# Patient Record
Sex: FEMALE | Race: WHITE | NOT HISPANIC OR LATINO | Employment: FULL TIME | URBAN - METROPOLITAN AREA
[De-identification: names, ages, dates, MRNs, and addresses within clinical notes are randomized per-mention and may not be internally consistent; named-entity substitution may affect disease eponyms.]

---

## 2018-06-29 ENCOUNTER — TRANSFERRED RECORDS (OUTPATIENT)
Dept: HEALTH INFORMATION MANAGEMENT | Facility: CLINIC | Age: 38
End: 2018-06-29

## 2019-01-03 ENCOUNTER — OFFICE VISIT (OUTPATIENT)
Dept: PEDIATRICS | Facility: CLINIC | Age: 39
End: 2019-01-03
Payer: COMMERCIAL

## 2019-01-03 VITALS
WEIGHT: 160.8 LBS | DIASTOLIC BLOOD PRESSURE: 60 MMHG | BODY MASS INDEX: 28.49 KG/M2 | HEIGHT: 63 IN | HEART RATE: 78 BPM | TEMPERATURE: 97.4 F | OXYGEN SATURATION: 98 % | SYSTOLIC BLOOD PRESSURE: 130 MMHG

## 2019-01-03 DIAGNOSIS — K21.9 GASTROESOPHAGEAL REFLUX DISEASE WITHOUT ESOPHAGITIS: Primary | ICD-10-CM

## 2019-01-03 PROCEDURE — 99203 OFFICE O/P NEW LOW 30 MIN: CPT | Performed by: NURSE PRACTITIONER

## 2019-01-03 RX ORDER — OMEPRAZOLE 40 MG/1
40 CAPSULE, DELAYED RELEASE ORAL DAILY
Qty: 90 CAPSULE | Refills: 1 | Status: SHIPPED | OUTPATIENT
Start: 2019-01-03 | End: 2019-07-22

## 2019-01-03 RX ORDER — OMEPRAZOLE 40 MG/1
40 CAPSULE, DELAYED RELEASE ORAL DAILY
COMMUNITY
Start: 2018-08-29 | End: 2019-01-03

## 2019-01-03 ASSESSMENT — MIFFLIN-ST. JEOR: SCORE: 1368.51

## 2019-01-03 NOTE — PATIENT INSTRUCTIONS
PLAN:   1.   Symptomatic therapy suggested: restart prilosec.  2.  Orders Placed This Encounter   Medications     DISCONTD: omeprazole (PRILOSEC) 40 MG DR capsule     Sig: Take 40 mg by mouth daily     omeprazole (PRILOSEC) 40 MG DR capsule     Sig: Take 1 capsule (40 mg) by mouth daily     Dispense:  90 capsule     Refill:  1     Orders Placed This Encounter   Procedures     GASTROENTEROLOGY ADULT REF PROCEDURE ONLY [facility] [phone]       3. Patient needs to follow up in if no improvement,or sooner if worsening of symptoms or other symptoms develop.  CONSULTATION/REFERRAL to endoscopy

## 2019-01-03 NOTE — PROGRESS NOTES
SUBJECTIVE:   Zulma Coronado is a 38 year old female who presents to clinic today for the following health issues:    New Patient/Transfer of Care    Medication Followup of omeprazole     Taking Medication as prescribed: yes    Side Effects:  None    Medication Helping Symptoms:  Yes-out of meds for 3 weeks   Has been on prilosec for at least 8 years on and off   Pretty consistently at least 5 years  Has had H pylori tested and was negative  Has tried OTC medications and has not helped in the past   Is taking Prilosec 20 mg daily and is having breakthrough  Has bad reflux that will shoot up in the middle   Not a coffee drinker, rarely alcohol   Does not smoke     Problem list and histories reviewed & adjusted, as indicated.  Additional history: as documented    Patient Active Problem List   Diagnosis     Gastroesophageal reflux disease without esophagitis     Past Surgical History:   Procedure Laterality Date     NO HISTORY OF SURGERY         Social History     Tobacco Use     Smoking status: Never Smoker     Smokeless tobacco: Never Used   Substance Use Topics     Alcohol use: Yes     Comment: rarely     Family History   Problem Relation Age of Onset     Gastrointestinal Disease Mother         ulcerative colitis     Arthritis Mother         fibromyalgia     Crohn's Disease Mother      Ulcerative Colitis Mother      Cerebrovascular Disease Maternal Grandfather          Current Outpatient Medications   Medication Sig Dispense Refill     omeprazole (PRILOSEC) 40 MG DR capsule Take 40 mg by mouth daily       Allergies   Allergen Reactions     No Known Drug Allergies      BP Readings from Last 3 Encounters:   01/03/19 130/60   03/29/02 114/70    Wt Readings from Last 3 Encounters:   01/03/19 72.9 kg (160 lb 12.8 oz)   03/29/02 64 kg (141 lb)                  Labs reviewed in EPIC    Reviewed and updated as needed this visit by clinical staff  Tobacco  Allergies  Meds  Med Hx  Surg Hx  Fam Hx  Soc Hx     "  Reviewed and updated as needed this visit by Provider         ROS:  Constitutional, HEENT, cardiovascular, pulmonary, GI, , musculoskeletal, neuro, skin, endocrine and psych systems are negative, except as otherwise noted.    OBJECTIVE:     /60 (BP Location: Right arm, Patient Position: Sitting, Cuff Size: Adult Regular)   Pulse 78   Temp 97.4  F (36.3  C) (Temporal)   Ht 1.6 m (5' 3\")   Wt 72.9 kg (160 lb 12.8 oz)   SpO2 98%   Breastfeeding? No   BMI 28.48 kg/m    Body mass index is 28.48 kg/m .  GENERAL APPEARANCE: alert, active and no distress  NECK: no adenopathy, no asymmetry, masses, or scars and thyroid normal to palpation  RESP: lungs clear to auscultation - no rales, rhonchi or wheezes  CV: regular rates and rhythm and no murmur, click or rub  ABDOMEN: soft, nontender, without hepatosplenomegaly or masses and bowel sounds normal  MS: extremities normal- no gross deformities noted  NEURO: Normal strength and tone, mentation intact and speech normal  PSYCH: mentation appears normal and affect normal/bright    Diagnostic Test Results:  Pending orders and results       ASSESSMENT/PLAN:     Zulma was seen today for establish care and heartburn.    Diagnoses and all orders for this visit:    Gastroesophageal reflux disease without esophagitis  Restart:      omeprazole (PRILOSEC) 40 MG DR capsule; Take 1 capsule (40 mg) by mouth daily  -     GASTROENTEROLOGY ADULT REF PROCEDURE ONLY [facility] [phone]  Discussed the etiology of GERD with patient.  Encouraged lifestyle changes and the need for a longterm regimen of medications to help reduce symptoms.  Discussed the potential for serious complications if symptoms are not resolved.    Discussed raising the head of the bed 4 to 6 inches; avoiding chocolate, coffee, peppermint, fruit juices, tomatoes, greasy and spicy foods.  Encouraged moderation of alcoholic beverages, and avoidance of smoking.    PLAN:   1.   Symptomatic therapy " suggested: restart prilosec.  2.  Orders Placed This Encounter   Medications     DISCONTD: omeprazole (PRILOSEC) 40 MG DR capsule     Sig: Take 40 mg by mouth daily     omeprazole (PRILOSEC) 40 MG DR capsule     Sig: Take 1 capsule (40 mg) by mouth daily     Dispense:  90 capsule     Refill:  1     Orders Placed This Encounter   Procedures     GASTROENTEROLOGY ADULT REF PROCEDURE ONLY [facility] [phone]       3. Patient needs to follow up in if no improvement,or sooner if worsening of symptoms or other symptoms develop.  CONSULTATION/REFERRAL to endoscopy     See Patient Instructions  I have reviewed the patient's Past Medical History, Past Surgical History, Social History, Problem List and Medication List.  I have updated the patient's Past Medical History, Past Surgical History, Short Social History, Family History and Medication List.    ROMA Gallagher CNP  [facility]

## 2019-01-03 NOTE — NURSING NOTE
"Chief Complaint   Patient presents with     Establish Care     Heartburn     acid reflux       Initial /60 (BP Location: Right arm, Patient Position: Sitting, Cuff Size: Adult Regular)   Pulse 78   Temp 97.4  F (36.3  C) (Temporal)   Ht 1.6 m (5' 3\")   Wt 72.9 kg (160 lb 12.8 oz)   SpO2 98%   Breastfeeding? No   BMI 28.48 kg/m   Estimated body mass index is 28.48 kg/m  as calculated from the following:    Height as of this encounter: 1.6 m (5' 3\").    Weight as of this encounter: 72.9 kg (160 lb 12.8 oz).  Medication Reconciliation: complete      IRMA Ojeda      "

## 2019-01-19 PROBLEM — K21.9 GASTROESOPHAGEAL REFLUX DISEASE WITHOUT ESOPHAGITIS: Status: ACTIVE | Noted: 2019-01-19

## 2019-01-25 ASSESSMENT — MIFFLIN-ST. JEOR: SCORE: 1368.13

## 2019-02-01 ENCOUNTER — SURGERY (OUTPATIENT)
Age: 39
End: 2019-02-01
Payer: COMMERCIAL

## 2019-02-01 ENCOUNTER — HOSPITAL ENCOUNTER (OUTPATIENT)
Facility: AMBULATORY SURGERY CENTER | Age: 39
Discharge: HOME | End: 2019-02-01
Attending: INTERNAL MEDICINE | Admitting: INTERNAL MEDICINE
Payer: COMMERCIAL

## 2019-02-01 VITALS
DIASTOLIC BLOOD PRESSURE: 70 MMHG | OXYGEN SATURATION: 96 % | RESPIRATION RATE: 20 BRPM | HEART RATE: 107 BPM | SYSTOLIC BLOOD PRESSURE: 115 MMHG | TEMPERATURE: 98.2 F | WEIGHT: 160.72 LBS | BODY MASS INDEX: 28.48 KG/M2 | HEIGHT: 63 IN

## 2019-02-01 LAB — UPPER GI ENDOSCOPY: NORMAL

## 2019-02-01 PROCEDURE — 43239 EGD BIOPSY SINGLE/MULTIPLE: CPT | Performed by: INTERNAL MEDICINE

## 2019-02-01 PROCEDURE — 43239 EGD BIOPSY SINGLE/MULTIPLE: CPT

## 2019-02-01 PROCEDURE — G8907 PT DOC NO EVENTS ON DISCHARG: HCPCS

## 2019-02-01 PROCEDURE — 88305 TISSUE EXAM BY PATHOLOGIST: CPT | Performed by: INTERNAL MEDICINE

## 2019-02-01 PROCEDURE — G8918 PT W/O PREOP ORDER IV AB PRO: HCPCS

## 2019-02-01 RX ORDER — FENTANYL CITRATE 50 UG/ML
INJECTION, SOLUTION INTRAMUSCULAR; INTRAVENOUS PRN
Status: DISCONTINUED | OUTPATIENT
Start: 2019-02-01 | End: 2019-02-01 | Stop reason: HOSPADM

## 2019-02-01 RX ORDER — LIDOCAINE 40 MG/G
CREAM TOPICAL
Status: DISCONTINUED | OUTPATIENT
Start: 2019-02-01 | End: 2019-02-02 | Stop reason: HOSPADM

## 2019-02-01 RX ORDER — ONDANSETRON 2 MG/ML
4 INJECTION INTRAMUSCULAR; INTRAVENOUS
Status: DISCONTINUED | OUTPATIENT
Start: 2019-02-01 | End: 2019-02-02 | Stop reason: HOSPADM

## 2019-02-01 RX ADMIN — FENTANYL CITRATE 50 MCG: 50 INJECTION, SOLUTION INTRAMUSCULAR; INTRAVENOUS at 12:02

## 2019-02-01 RX ADMIN — FENTANYL CITRATE 50 MCG: 50 INJECTION, SOLUTION INTRAMUSCULAR; INTRAVENOUS at 12:04

## 2019-02-01 RX ADMIN — FENTANYL CITRATE 25 MCG: 50 INJECTION, SOLUTION INTRAMUSCULAR; INTRAVENOUS at 12:08

## 2019-02-05 LAB — COPATH REPORT: NORMAL

## 2019-02-12 ENCOUNTER — TELEPHONE (OUTPATIENT)
Dept: PEDIATRICS | Facility: CLINIC | Age: 39
End: 2019-02-12

## 2019-02-12 NOTE — TELEPHONE ENCOUNTER
[facility] Call Center    Phone Message    May a detailed message be left on voicemail: no    Reason for Call: Requesting Results   Name/type of test: Endoscopy biopsy  Date of test: 02.01.19  Was test done at a location other than [facility] (Please fill in the location if not [facility])?: No. At     Pt is asking for a call back regarding test results.  [facility] recommended message.   Action Taken: Message routed to:  Primary Care p [phone]

## 2019-02-12 NOTE — TELEPHONE ENCOUNTER
Called and spoke to patient who is aware of the 2/6/19 pathology results and the 2/6/19 letter from Dr. Nunez. Patient verbalized understanding and is aware that she will be receiving a letter with the results in the mail. Patient had no further questions at this time.    Antonia Jensen RN, BSN

## 2019-03-09 ENCOUNTER — HEALTH MAINTENANCE LETTER (OUTPATIENT)
Age: 39
End: 2019-03-09

## 2019-07-22 DIAGNOSIS — K21.9 GASTROESOPHAGEAL REFLUX DISEASE WITHOUT ESOPHAGITIS: ICD-10-CM

## 2019-07-22 RX ORDER — OMEPRAZOLE 40 MG/1
CAPSULE, DELAYED RELEASE ORAL
Qty: 90 CAPSULE | Refills: 1 | Status: SHIPPED | OUTPATIENT
Start: 2019-07-22 | End: 2020-02-04

## 2019-07-22 NOTE — TELEPHONE ENCOUNTER
Please refer to RN refill guidelines. Refilled per protocol.    Gladis Dickson RN   [facility], [facility]

## 2019-08-23 ENCOUNTER — OFFICE VISIT (OUTPATIENT)
Dept: OBGYN | Facility: CLINIC | Age: 39
End: 2019-08-23
Payer: COMMERCIAL

## 2019-08-23 ENCOUNTER — RESULT FOLLOW UP (OUTPATIENT)
Dept: OBGYN | Facility: OTHER | Age: 39
End: 2019-08-23

## 2019-08-23 VITALS
SYSTOLIC BLOOD PRESSURE: 117 MMHG | HEIGHT: 63 IN | DIASTOLIC BLOOD PRESSURE: 70 MMHG | WEIGHT: 138.31 LBS | BODY MASS INDEX: 24.51 KG/M2 | HEART RATE: 70 BPM

## 2019-08-23 DIAGNOSIS — R87.610 ASCUS WITH POSITIVE HIGH RISK HPV CERVICAL: ICD-10-CM

## 2019-08-23 DIAGNOSIS — Z97.5 IUD (INTRAUTERINE DEVICE) IN PLACE: ICD-10-CM

## 2019-08-23 DIAGNOSIS — Z13.6 CARDIOVASCULAR SCREENING; LDL GOAL LESS THAN 160: ICD-10-CM

## 2019-08-23 DIAGNOSIS — R87.810 ASCUS WITH POSITIVE HIGH RISK HPV CERVICAL: ICD-10-CM

## 2019-08-23 DIAGNOSIS — Z01.419 WELL FEMALE EXAM WITH ROUTINE GYNECOLOGICAL EXAM: Primary | ICD-10-CM

## 2019-08-23 PROCEDURE — 87624 HPV HI-RISK TYP POOLED RSLT: CPT | Performed by: OBSTETRICS & GYNECOLOGY

## 2019-08-23 PROCEDURE — 99386 PREV VISIT NEW AGE 40-64: CPT | Performed by: OBSTETRICS & GYNECOLOGY

## 2019-08-23 PROCEDURE — 88142 CYTOPATH C/V THIN LAYER: CPT | Performed by: OBSTETRICS & GYNECOLOGY

## 2019-08-23 ASSESSMENT — MIFFLIN-ST. JEOR: SCORE: 1261.51

## 2019-08-23 NOTE — PROGRESS NOTES
SUBJECTIVE:   CC: Zulma Coronado is an 39 year old woman who presents for preventive health visit.     Healthy Habits:    Do you get at least three servings of calcium containing foods daily (dairy, green leafy vegetables, etc.)? yes    Amount of exercise or daily activities, outside of work: 5 day(s) per week    Problems taking medications regularly No    Medication side effects: No    Have you had an eye exam in the past two years? no    Do you see a dentist twice per year? yes    Do you have sleep apnea, excessive snoring or daytime drowsiness?no      No concerns    Lipids last done at [facility] in , normal.      No history of GDM or GHTN.  Mirena for contraception.     ASCUS with positive high risk HPV cervical 2018   Overview:     2018 ASCUS, HPV +  2018 [place]: Negative    Plan: Cotesting 2019           Today's PHQ-2 Score:   PHQ-2 (  [employer]) 1/3/2019   Q1: Little interest or pleasure in doing things 0   Q2: Feeling down, depressed or hopeless 0   PHQ-2 Score 0       Abuse: Current or Past(Physical, Sexual or Emotional)- No  Do you feel safe in your environment? Yes    Social History     Tobacco Use     Smoking status: Never Smoker     Smokeless tobacco: Never Used   Substance Use Topics     Alcohol use: Yes     Comment: rarely     If you drink alcohol do you typically have >3 drinks per day or >7 drinks per week? No                     BP Readings from Last 3 Encounters:   19 117/70   19 115/70   19 130/60    Wt Readings from Last 3 Encounters:   19 62.7 kg (138 lb 5 oz)   19 72.9 kg (160 lb 11.5 oz)   19 72.9 kg (160 lb 12.8 oz)          Obstetrics History      Grav Para Term Pre Abrt (TAB) (SAB) (Ect) Mult Lvng Comments   2 2 1 1           2      Date Outcome GA Total Labor Labor/2nd/3rd Weight Sex Delivery Anes PTL Prema A1 A5 Name Clin   2004 Term 37w0d     6 lb 14 oz F      Living     radha     2011  34w2d     6 lb 2.4 oz M       Living 7 8 [facility]               Patient Active Problem List   Diagnosis     Gastroesophageal reflux disease without esophagitis     Past Surgical History:   Procedure Laterality Date      SECTION       COMBINED ESOPHAGOSCOPY, GASTROSCOPY, DUODENOSCOPY (EGD) WITH CO2 INSUFFLATION N/A 2019    Procedure: COMBINED ESOPHAGOSCOPY, GASTROSCOPY, DUODENOSCOPY (EGD) WITH CO2 INSUFFLATION;  Surgeon: Chacorta Nunez MD;  Location: [facility] OR     ESOPHAGOSCOPY, GASTROSCOPY, DUODENOSCOPY (EGD), COMBINED N/A 2019    Procedure: Combined Esophagoscopy, Gastroscopy, Duodenoscopy (Egd), Biopsy Single Or Multiple;  Surgeon: Chacorta Nunez MD;  Location: [facility] OR     NO HISTORY OF SURGERY         Social History     Tobacco Use     Smoking status: Never Smoker     Smokeless tobacco: Never Used   Substance Use Topics     Alcohol use: Yes     Comment: rarely     Family History   Problem Relation Age of Onset     Gastrointestinal Disease Mother         ulcerative colitis     Arthritis Mother         fibromyalgia     Crohn's Disease Mother      Ulcerative Colitis Mother      Cerebrovascular Disease Maternal Grandfather          Current Outpatient Medications   Medication Sig Dispense Refill     levonorgestrel (MIRENA) 20 MCG/24HR IUD 1 each by Intrauterine route once       omeprazole (PRILOSEC) 40 MG DR capsule TAKE 1 CAPSULE BY MOUTH EVERY DAY 90 capsule 1     Allergies   Allergen Reactions     No Known Drug Allergies      No lab results found.     Mammogram Screening: Patient under age 50, mutual decision reflected in health maintenance.      History of abnormal Pap smear: YES - updated in Problem List and Health Maintenance accordingly     ROS:  CONSTITUTIONAL: NEGATIVE for fever, chills, change in weight  INTEGUMENTARU/SKIN: NEGATIVE for worrisome rashes, moles or lesions  EYES: NEGATIVE for vision changes or irritation  ENT: NEGATIVE for ear, mouth and throat problems  RESP: NEGATIVE for  "significant cough or SOB  BREAST: NEGATIVE for masses, tenderness or discharge  CV: NEGATIVE for chest pain, palpitations or peripheral edema  GI: NEGATIVE for nausea, abdominal pain, heartburn, or change in bowel habits  : NEGATIVE for unusual urinary or vaginal symptoms. No regular periods with the IUD in place.  She will go 6 months without spotting, then have some irregular spotting for two week.   MUSCULOSKELETAL: NEGATIVE for significant arthralgias or myalgia  NEURO: NEGATIVE for weakness, dizziness or paresthesias  PSYCHIATRIC: NEGATIVE for changes in mood or affect    OBJECTIVE:   /70 (BP Location: Right arm, Patient Position: Sitting, Cuff Size: Adult Regular)   Pulse 70   Ht 1.6 m (5' 3\")   Wt 62.7 kg (138 lb 5 oz)   BMI 24.50 kg/m    EXAM:  Gen: Alert and oriented times 3, no acute distress.  Well developed, well nourished, pleasant.    Neck: Supple, no masses.  No thyromegaly.  Breast: Symmetrical without lesions.  No dimpling, nipple discharge, or discrete masses.  No lymphadenopathy.  Chest:  Non labored.  Clear to auscultation bilaterally.    Heart: Regular, normal S1, S2.  No murmurs.   Abdomen: Soft, nontender, nondistended.  No hepatosplenomegaly.    :  Normal female external genitalia.  No lesions.  Urethral meatus normal.  Speculum exam reveals a normal vaginal vault, normal cervix .  No abnormal discharge.  Bimanual exam reveals a normal, mobile, nontender uterus .  No cervical motion tenderness.  Adnexa nontender with no palpable masses.    Extremities:  Nontender, no edema.    Pap obtained:  Yes     ASSESSMENT/PLAN:       ICD-10-CM    1. Well female exam with routine gynecological exam Z01.419 **Glucose FUTURE anytime   2. ASCUS with positive high risk HPV cervical R87.610 Pap imaged thin layer diagnostic with HPV (select HPV order below)    R87.810 HPV High Risk Types DNA Cervical   3. IUD (intrauterine device) in place Z97.5    4. CARDIOVASCULAR SCREENING; LDL GOAL LESS THAN " "160 Z13.6 Lipid panel reflex to direct LDL Fasting       IUD replaced 3/1/16 at [facility], due for replacement March 2021    GERD - continue with GI.      COUNSELING:   Reviewed preventive health counseling, as reflected in patient instructions    Estimated body mass index is 24.5 kg/m  as calculated from the following:    Height as of this encounter: 1.6 m (5' 3\").    Weight as of this encounter: 62.7 kg (138 lb 5 oz).         reports that she has never smoked. She has never used smokeless tobacco.      Counseling Resources:  ATP IV Guidelines  Pooled Cohorts Equation Calculator  Breast Cancer Risk Calculator  FRAX Risk Assessment  ICSI Preventive Guidelines  Dietary Guidelines for Americans, 2010  USDA's MyPlate  ASA Prophylaxis  Lung CA Screening    Katt Rush MD  [facility]  "

## 2019-08-28 DIAGNOSIS — Z12.39 SCREENING FOR BREAST CANCER: ICD-10-CM

## 2019-08-29 LAB
FINAL DIAGNOSIS: ABNORMAL
HPV HR 12 DNA CVX QL NAA+PROBE: POSITIVE
HPV16 DNA SPEC QL NAA+PROBE: NEGATIVE
HPV18 DNA SPEC QL NAA+PROBE: NEGATIVE
SPECIMEN DESCRIPTION: ABNORMAL
SPECIMEN SOURCE CVX/VAG CYTO: ABNORMAL

## 2019-08-30 LAB
COPATH REPORT: NORMAL
PAP: NORMAL

## 2019-08-30 NOTE — PROGRESS NOTES
"7/2018 ASCUS, HPV + at [facility]  8/2018 [place]: Negative  At [facility]  Plan: Cotesting 8/2019 8/23/19 NIL pap, + HR HPV (not 16 or 18). Plan: [place]  9/3/19 Pt notified by phone.  11/22/19 [place] ECC \"cervicitis and epithelial atypia\". Plan: cotest due 11/22/20    "

## 2019-11-04 ENCOUNTER — HEALTH MAINTENANCE LETTER (OUTPATIENT)
Age: 39
End: 2019-11-04

## 2019-11-22 ENCOUNTER — OFFICE VISIT (OUTPATIENT)
Dept: OBGYN | Facility: CLINIC | Age: 39
End: 2019-11-22
Payer: COMMERCIAL

## 2019-11-22 VITALS
WEIGHT: 143.3 LBS | SYSTOLIC BLOOD PRESSURE: 113 MMHG | HEART RATE: 85 BPM | BODY MASS INDEX: 25.38 KG/M2 | DIASTOLIC BLOOD PRESSURE: 72 MMHG

## 2019-11-22 DIAGNOSIS — B96.89 BV (BACTERIAL VAGINOSIS): Primary | ICD-10-CM

## 2019-11-22 DIAGNOSIS — R87.610 ASCUS WITH POSITIVE HIGH RISK HPV CERVICAL: Primary | ICD-10-CM

## 2019-11-22 DIAGNOSIS — N90.89 VULVAR IRRITATION: ICD-10-CM

## 2019-11-22 DIAGNOSIS — R87.810 ASCUS WITH POSITIVE HIGH RISK HPV CERVICAL: Primary | ICD-10-CM

## 2019-11-22 DIAGNOSIS — N76.0 BV (BACTERIAL VAGINOSIS): Primary | ICD-10-CM

## 2019-11-22 LAB
SPECIMEN SOURCE: ABNORMAL
WET PREP SPEC: ABNORMAL

## 2019-11-22 PROCEDURE — 99213 OFFICE O/P EST LOW 20 MIN: CPT | Mod: 25 | Performed by: OBSTETRICS & GYNECOLOGY

## 2019-11-22 PROCEDURE — 87210 SMEAR WET MOUNT SALINE/INK: CPT | Performed by: OBSTETRICS & GYNECOLOGY

## 2019-11-22 PROCEDURE — 88342 IMHCHEM/IMCYTCHM 1ST ANTB: CPT | Performed by: OBSTETRICS & GYNECOLOGY

## 2019-11-22 PROCEDURE — 88341 IMHCHEM/IMCYTCHM EA ADD ANTB: CPT | Performed by: OBSTETRICS & GYNECOLOGY

## 2019-11-22 PROCEDURE — 88305 TISSUE EXAM BY PATHOLOGIST: CPT | Performed by: OBSTETRICS & GYNECOLOGY

## 2019-11-22 PROCEDURE — 57456 ENDOCERV CURETTAGE W/SCOPE: CPT | Performed by: OBSTETRICS & GYNECOLOGY

## 2019-11-22 RX ORDER — METRONIDAZOLE 7.5 MG/G
1 GEL VAGINAL DAILY
Qty: 25 G | Refills: 0 | Status: SHIPPED | OUTPATIENT
Start: 2019-11-22 | End: 2019-11-27

## 2019-11-22 NOTE — RESULT ENCOUNTER NOTE
Hi,    Your test result is consistent with bacterial vaginosis.  I have sent in a prescription to [facility] in [place]. Please let me know if you have any questions or concerns.     Thanks!  Dr. Rush

## 2019-11-22 NOTE — NURSING NOTE
"Chief Complaint   Patient presents with     Colposcopy       Initial /72 (BP Location: Right arm, Cuff Size: Adult Regular)   Pulse 85   Wt 65 kg (143 lb 4.8 oz)   BMI 25.38 kg/m   Estimated body mass index is 25.38 kg/m  as calculated from the following:    Height as of 19: 1.6 m (5' 3\").    Weight as of this encounter: 65 kg (143 lb 4.8 oz).  BP completed using cuff size: regular        The following HM Due: NONE      The following patient reported/Care Every where data was sent to:  P ABSTRACT QUALITY INITIATIVES [[id number]]       Katt Boss MA on 2019 at 2:03 PM           "

## 2019-11-22 NOTE — PROGRESS NOTES
39 year old  presents for colposcopy.      Indication for procedure: NIL pap, + HR HPV (not 16 or 18).  Prior history of cervical dysplasia:   2018 ASCUS, + hr HPV  at [facility]  2018 [place]: Negative  At [facility]  Plan: Cotesting 19 NIL pap, + HR HPV (not 16 or 18). Plan: [place]    Prior Colposcopy history: Yes as above  Prior LEEP:No  No LMP recorded. (Menstrual status: IUD).    Tobacco: No  She denies the possibility of pregnancy. IUD in place    Discussed nature of HPV related infection, natural history and association with cervical dysplasia.  Procedure for colposcopy and biopsy was explained to the patient and consent obtained.  All the patient's questions were answered.    PROCEDURE:  COLPOSCOPY  After a procedural timeout was taken, she was positioned in dorsal lithotomy    Vulvar exam normal.  No abnormal discharge.  Wet prep collected after speculum placed. IUD strings seen    a speculum was inserted to allow visualization of the cervix. A 5% acetic acid solution was applied to the ectocervix with large swabs. Lugols solution was also applied.  Colposcopic examination was then undertaken of the cervix, distal vaginal canal and vaginal fornices.    FINDINGS:  Physical Exam    Procedures    Cervix: no visible lesions, no mosaicism, no punctation and no abnormal vasculature; SCJ visualized 360 degrees without lesions and endocervical curettage performed. Hemostasis noted    Vaginal inspection: distal vagina normal without visible lesions.    Vulvar colposcopy: vulvar colposcopy not performed.    Procedure Summary: Patient tolerated procedure well and colposcopy adequate.    Colposcopic Impression: normal    15 minutes were spent in face to face discussion regarding her abnormal pap, separate from the procedure.     Plan: Specimens labelled and sent to pathology. and call to discuss Pathology results.  If mild or better, plan pap and HPV in 2020.     Wt prep done.  mychart with results.         Katt Rush MD

## 2019-11-26 ENCOUNTER — MYC MEDICAL ADVICE (OUTPATIENT)
Dept: OBGYN | Facility: CLINIC | Age: 39
End: 2019-11-26

## 2019-11-27 LAB — COPATH REPORT: NORMAL

## 2019-12-03 ENCOUNTER — OFFICE VISIT (OUTPATIENT)
Dept: OBGYN | Facility: CLINIC | Age: 39
End: 2019-12-03
Payer: COMMERCIAL

## 2019-12-03 VITALS
WEIGHT: 142.9 LBS | OXYGEN SATURATION: 99 % | DIASTOLIC BLOOD PRESSURE: 75 MMHG | BODY MASS INDEX: 25.31 KG/M2 | HEART RATE: 81 BPM | SYSTOLIC BLOOD PRESSURE: 114 MMHG

## 2019-12-03 DIAGNOSIS — B96.89 BV (BACTERIAL VAGINOSIS): Primary | ICD-10-CM

## 2019-12-03 DIAGNOSIS — L28.0 LICHEN SIMPLEX: ICD-10-CM

## 2019-12-03 DIAGNOSIS — N76.0 BV (BACTERIAL VAGINOSIS): Primary | ICD-10-CM

## 2019-12-03 LAB
SPECIMEN SOURCE: NORMAL
WET PREP SPEC: NORMAL

## 2019-12-03 PROCEDURE — 99213 OFFICE O/P EST LOW 20 MIN: CPT | Performed by: OBSTETRICS & GYNECOLOGY

## 2019-12-03 PROCEDURE — 87210 SMEAR WET MOUNT SALINE/INK: CPT | Performed by: OBSTETRICS & GYNECOLOGY

## 2019-12-03 RX ORDER — TRIAMCINOLONE ACETONIDE 1 MG/G
OINTMENT TOPICAL 2 TIMES DAILY
Qty: 30 G | Refills: 1 | Status: SHIPPED | OUTPATIENT
Start: 2019-12-03 | End: 2019-12-17

## 2019-12-03 NOTE — PROGRESS NOTES
OB/GYN      NAME:  Zulma Coronado  PCP:  No Ref-Primary, Physician  MRN:  [id number]    Impression / Plan     39 year old  with:      ICD-10-CM    1. BV (bacterial vaginosis) N76.0 Wet prep    B96.89    2. Lichen simplex L28.0 triamcinolone (KENALOG) 0.1 % external ointment       Discussed exam findings.  No signs of yeast infection.  Wet prep sent to make sure BV has resolved.  Symptoms are more consistent with contact dermatitis.  She will continue with the vulvar hygiene changes she has already made.  Plan two weeks of triamcinolone ointment.  She will contact me if her symptoms do not improve.      Chief Complaint     Chief Complaint   Patient presents with     Consult     BV       HPI     Zulma Coronado is a  39 year old female who is seen for follow up.     Symptoms for two months.  Burning of the labia/vulva, no itching.  Irritation on left side, on the labia.  Not sure if it is inflamed.   Seat warmers worsen the symptoms. Urine contact worsens the symptoms.     She has more discharge than when I saw her for her colp, when had a wet prep positive for BV.  She was treated with metrogel with no relief aside from resolution of odor.  She had recently used an otc antifungal.     She is not sexually active.     No LMP recorded. (Menstrual status: IUD).     Date of Last Pap Smear:   Lab Results   Component Value Date    PAP NIL 2019       Problem List     Patient Active Problem List    Diagnosis Date Noted     ASCUS with positive high risk HPV cervical 2019     Priority: Medium     2018 ASCUS, + hr HPV  at [facility]  2018 [place]: Negative  At [facility]  Plan: Cotesting 19 NIL pap, + HR HPV (not 16 or 18). Plan: [place]  19 [place]         IUD (intrauterine device) in place 2019     Priority: Medium     2nd Mirena placed at [facility] 3/1/16. Due for replacement 2021       Gastroesophageal reflux disease without esophagitis 2019     Priority: Medium        Medications     Current Outpatient Medications   Medication     omeprazole (PRILOSEC) 40 MG DR capsule     levonorgestrel (MIRENA) 20 MCG/24HR IUD     No current facility-administered medications for this visit.         Allergies     Allergies   Allergen Reactions     No Known Drug Allergies        ROS     A 10 organ review of systems was asked and the pertinent positives and negatives are listed in the HPI. All other organ systems can be considered negative.     Physical Exam   Vitals: /75   Pulse 81   Wt 64.8 kg (142 lb 14.4 oz)   SpO2 99%   BMI 25.31 kg/m      General: Comfortable, no obvious distress  Psych: Alert and orientated x 3. Appropriate affect, good insight.   : Normal female external genitalia.  No lesions.  Urethral meatus normal.  Speculum exam reveals a normal vaginal vault, normal cervix.  Minimal discharge.  Bimanual exam reveals a normal, mobile uterus.  No cervical motion tenderness.  Adnexa nontender with no palpable masses.  IUD strings seen.  She is generally tender with the insertion of the speculum. No discrete tenderness noted.        Labs/Imaging       Labs were reviewed in Epic       20 minutes was spent with patient, more than 50% counseling and coordinating care as noted above in the A/P    Nursing notes read and reviewed    Katt Rush MD

## 2020-02-04 DIAGNOSIS — K21.9 GASTROESOPHAGEAL REFLUX DISEASE WITHOUT ESOPHAGITIS: ICD-10-CM

## 2020-02-04 RX ORDER — OMEPRAZOLE 40 MG/1
CAPSULE, DELAYED RELEASE ORAL
Qty: 30 CAPSULE | Refills: 0 | Status: SHIPPED | OUTPATIENT
Start: 2020-02-04 | End: 2020-02-14

## 2020-02-04 NOTE — LETTER
February 4, 2020      Zulma Coronado  [address] [address]      Dear Zulma,    We recently received a call from your pharmacy requesting a refill of your medication.    A review of your chart indicates that an appointment is required with your provider.  Please call the clinic to schedule your appointment.    We have authorized one refill of your medication to allow time for you to schedule.   If you have a history of diabetes or high cholesterol, please come in fasting for the appointment. Fasting entails nothing to eat or drink 8 hours prior to your appointment; with the exception on water. You may take your medication the day of the appointment.    Thank you,      Samanta Narvaez CNP

## 2020-02-14 ENCOUNTER — OFFICE VISIT (OUTPATIENT)
Dept: PEDIATRICS | Facility: CLINIC | Age: 40
End: 2020-02-14
Payer: COMMERCIAL

## 2020-02-14 VITALS
BODY MASS INDEX: 24.26 KG/M2 | DIASTOLIC BLOOD PRESSURE: 60 MMHG | SYSTOLIC BLOOD PRESSURE: 100 MMHG | HEIGHT: 64 IN | HEART RATE: 70 BPM | OXYGEN SATURATION: 100 % | WEIGHT: 142.1 LBS | TEMPERATURE: 97.9 F

## 2020-02-14 DIAGNOSIS — K21.9 GASTROESOPHAGEAL REFLUX DISEASE WITHOUT ESOPHAGITIS: ICD-10-CM

## 2020-02-14 PROCEDURE — 99213 OFFICE O/P EST LOW 20 MIN: CPT | Performed by: NURSE PRACTITIONER

## 2020-02-14 RX ORDER — OMEPRAZOLE 40 MG/1
CAPSULE, DELAYED RELEASE ORAL
Qty: 90 CAPSULE | Refills: 3 | Status: SHIPPED | OUTPATIENT
Start: 2020-02-14 | End: 2021-03-01

## 2020-02-14 ASSESSMENT — MIFFLIN-ST. JEOR: SCORE: 1286.62

## 2020-02-14 NOTE — PATIENT INSTRUCTIONS
PLAN:   1.   Symptomatic therapy suggested: Continue current medication regimen unchanged.  2.  Orders Placed This Encounter   Medications     omeprazole (PRILOSEC) 40 MG DR capsule     Sig: TAKE 1 CAPSULE BY MOUTH EVERY DAY     Dispense:  90 capsule     Refill:  3     3. Patient needs to follow up in if no improvement,or sooner if worsening of symptoms or other symptoms develop.  Follow up in 1 year.

## 2020-02-14 NOTE — NURSING NOTE
"Chief Complaint   Patient presents with     Heartburn     recheck acid reflux medication       Initial /60 (BP Location: Right arm, Patient Position: Sitting, Cuff Size: Adult Regular)   Pulse 70   Temp 97.9  F (36.6  C) (Temporal)   Ht 1.613 m (5' 3.5\")   Wt 64.5 kg (142 lb 1.6 oz)   SpO2 100%   BMI 24.78 kg/m   Estimated body mass index is 24.78 kg/m  as calculated from the following:    Height as of this encounter: 1.613 m (5' 3.5\").    Weight as of this encounter: 64.5 kg (142 lb 1.6 oz).  Medication Reconciliation: complete      IRMA Ojeda      "

## 2020-02-14 NOTE — PROGRESS NOTES
Subjective     Zulma Coronado is a 39 year old female who presents to clinic today for the following health issues:    HPI   Medication Followup of omeprazole     Taking Medication as prescribed: yes    Side Effects:  None    Medication Helping Symptoms:  yes   Has had issues with GERD for many years   Was scoped last year and no Barretts   Plan to continue prilosec as has tried months at a time of zantac and pepcid with no benefit  Very rarely will every have any break through   Patient Active Problem List   Diagnosis     Gastroesophageal reflux disease without esophagitis     ASCUS with positive high risk HPV cervical     IUD (intrauterine device) in place     Past Surgical History:   Procedure Laterality Date      SECTION       COMBINED ESOPHAGOSCOPY, GASTROSCOPY, DUODENOSCOPY (EGD) WITH CO2 INSUFFLATION N/A 2019    Procedure: COMBINED ESOPHAGOSCOPY, GASTROSCOPY, DUODENOSCOPY (EGD) WITH CO2 INSUFFLATION;  Surgeon: Chacorta Nunez MD;  Location: [facility]     ESOPHAGOSCOPY, GASTROSCOPY, DUODENOSCOPY (EGD), COMBINED N/A 2019    Procedure: Combined Esophagoscopy, Gastroscopy, Duodenoscopy (Egd), Biopsy Single Or Multiple;  Surgeon: Chacorta Nunez MD;  Location: [facility]       Social History     Tobacco Use     Smoking status: Never Smoker     Smokeless tobacco: Never Used   Substance Use Topics     Alcohol use: Yes     Comment: rarely     Family History   Problem Relation Age of Onset     Arthritis Mother         fibromyalgia     Crohn's Disease Mother      Ulcerative Colitis Mother      Cerebrovascular Disease Maternal Grandfather          Current Outpatient Medications   Medication Sig Dispense Refill     levonorgestrel (MIRENA) 20 MCG/24HR IUD 1 each by Intrauterine route once       omeprazole (PRILOSEC) 40 MG DR capsule TAKE 1 CAPSULE BY MOUTH EVERY DAY 30 capsule 0     Allergies   Allergen Reactions     No Known Drug Allergies      BP Readings from Last 3 Encounters:  "  02/14/20 100/60   12/03/19 114/75   11/22/19 113/72    Wt Readings from Last 3 Encounters:   02/14/20 64.5 kg (142 lb 1.6 oz)   12/03/19 64.8 kg (142 lb 14.4 oz)   11/22/19 65 kg (143 lb 4.8 oz)            Reviewed and updated as needed this visit by Provider         Review of Systems   ROS COMP: Constitutional, HEENT, cardiovascular, pulmonary, gi and gu systems are negative, except as otherwise noted.      Objective    /60 (BP Location: Right arm, Patient Position: Sitting, Cuff Size: Adult Regular)   Pulse 70   Temp 97.9  F (36.6  C) (Temporal)   Ht 1.613 m (5' 3.5\")   Wt 64.5 kg (142 lb 1.6 oz)   SpO2 100%   BMI 24.78 kg/m    Body mass index is 24.78 kg/m .   Wt Readings from Last 4 Encounters:   02/14/20 64.5 kg (142 lb 1.6 oz)   12/03/19 64.8 kg (142 lb 14.4 oz)   11/22/19 65 kg (143 lb 4.8 oz)   08/23/19 62.7 kg (138 lb 5 oz)       Physical Exam   GENERAL APPEARANCE: alert, active and no distress  RESP: lungs clear to auscultation - no rales, rhonchi or wheezes  CV: regular rates and rhythm and no murmur, click or rub  ABDOMEN: soft, nontender, without hepatosplenomegaly or masses and bowel sounds normal  MS: extremities normal- no gross deformities noted  SKIN: Skin color, texture, turgor normal.   NEURO: Normal strength and tone, mentation intact and speech normal  PSYCH: mentation appears normal and affect normal/bright  MENTAL STATUS EXAM:  Appearance/Behavior: No apparent distress and Casually groomed  Speech: Normal  Mood/Affect: normal affect  Insight: Adequate    Diagnostic Test Results:  Labs reviewed in Epic        Assessment & Plan     Zulma was seen today for heartburn.    Diagnoses and all orders for this visit:    Gastroesophageal reflux disease without esophagitis  -     omeprazole (PRILOSEC) 40 MG DR capsule; TAKE 1 CAPSULE BY MOUTH EVERY DAY  Discussed that these symptoms is likely related to reflux or GERD. Discussed non-pharmacologic treatment, including elevating the head of " bed, decrease food before bedtime and decreased caffeine and nicotine and alcohol.  Went over meds for reflux. Pt will continue prilosec. Recheck if not improving as expected.    See Patient Instructions  Patient Instructions     PLAN:   1.   Symptomatic therapy suggested: Continue current medication regimen unchanged.  2.  Orders Placed This Encounter   Medications     omeprazole (PRILOSEC) 40 MG DR capsule     Sig: TAKE 1 CAPSULE BY MOUTH EVERY DAY     Dispense:  90 capsule     Refill:  3     3. Patient needs to follow up in if no improvement,or sooner if worsening of symptoms or other symptoms develop.  Follow up in 1 year.      No follow-ups on file.    ROMA Gallagher CNP  [facility]

## 2020-10-05 ENCOUNTER — ANCILLARY PROCEDURE (OUTPATIENT)
Dept: MAMMOGRAPHY | Facility: CLINIC | Age: 40
End: 2020-10-05
Attending: NURSE PRACTITIONER
Payer: COMMERCIAL

## 2020-10-05 DIAGNOSIS — Z12.31 VISIT FOR SCREENING MAMMOGRAM: ICD-10-CM

## 2020-10-05 PROCEDURE — 77063 BREAST TOMOSYNTHESIS BI: CPT | Performed by: RADIOLOGY

## 2020-10-05 PROCEDURE — 77067 SCR MAMMO BI INCL CAD: CPT | Performed by: RADIOLOGY

## 2020-11-16 ENCOUNTER — HEALTH MAINTENANCE LETTER (OUTPATIENT)
Age: 40
End: 2020-11-16

## 2020-12-21 ENCOUNTER — OFFICE VISIT (OUTPATIENT)
Dept: OBGYN | Facility: CLINIC | Age: 40
End: 2020-12-21
Payer: COMMERCIAL

## 2020-12-21 VITALS
HEART RATE: 82 BPM | BODY MASS INDEX: 25.1 KG/M2 | SYSTOLIC BLOOD PRESSURE: 134 MMHG | HEIGHT: 64 IN | DIASTOLIC BLOOD PRESSURE: 74 MMHG | OXYGEN SATURATION: 100 % | WEIGHT: 147 LBS

## 2020-12-21 DIAGNOSIS — Z13.6 CARDIOVASCULAR SCREENING; LDL GOAL LESS THAN 160: ICD-10-CM

## 2020-12-21 DIAGNOSIS — Z13.1 SCREENING FOR DIABETES MELLITUS: ICD-10-CM

## 2020-12-21 DIAGNOSIS — Z30.432 ENCOUNTER FOR IUD REMOVAL: ICD-10-CM

## 2020-12-21 DIAGNOSIS — R87.610 ASCUS WITH POSITIVE HIGH RISK HPV CERVICAL: ICD-10-CM

## 2020-12-21 DIAGNOSIS — Z11.59 ENCOUNTER FOR HEPATITIS C SCREENING TEST FOR LOW RISK PATIENT: ICD-10-CM

## 2020-12-21 DIAGNOSIS — Z01.419 WELL FEMALE EXAM WITH ROUTINE GYNECOLOGICAL EXAM: Primary | ICD-10-CM

## 2020-12-21 DIAGNOSIS — R87.810 ASCUS WITH POSITIVE HIGH RISK HPV CERVICAL: ICD-10-CM

## 2020-12-21 PROBLEM — Z97.5 IUD (INTRAUTERINE DEVICE) IN PLACE: Status: RESOLVED | Noted: 2019-08-23 | Resolved: 2020-12-21

## 2020-12-21 PROCEDURE — 87624 HPV HI-RISK TYP POOLED RSLT: CPT | Performed by: OBSTETRICS & GYNECOLOGY

## 2020-12-21 PROCEDURE — 99396 PREV VISIT EST AGE 40-64: CPT | Mod: 25 | Performed by: OBSTETRICS & GYNECOLOGY

## 2020-12-21 PROCEDURE — 58301 REMOVE INTRAUTERINE DEVICE: CPT | Performed by: OBSTETRICS & GYNECOLOGY

## 2020-12-21 RX ORDER — INFLUENZA A VIRUS A/GUANGDONG-MAONAN/SWL1536/2019 CNIC-1909 (H1N1) ANTIGEN (FORMALDEHYDE INACTIVATED), INFLUENZA A VIRUS A/HONG KONG/2671/2019 (H3N2) ANTIGEN (FORMALDEHYDE INACTIVATED), INFLUENZA B VIRUS B/PHUKET/3073/2013 ANTIGEN (FORMALDEHYDE INACTIVATED), AND INFLUENZA B VIRUS B/WASHINGTON/02/2019 ANTIGEN (FORMALDEHYDE INACTIVATED) 15; 15; 15; 15 UG/.5ML; UG/.5ML; UG/.5ML; UG/.5ML
INJECTION, SUSPENSION INTRAMUSCULAR
COMMUNITY
Start: 2020-10-22 | End: 2021-10-11

## 2020-12-21 ASSESSMENT — MIFFLIN-ST. JEOR: SCORE: 1311.79

## 2020-12-21 NOTE — PROGRESS NOTES
SUBJECTIVE:   CC: Zulma Coronado is an 40 year old woman who presents for preventive health visit.       Patient has been advised of split billing requirements and indicates understanding: Yes  Healthy Habits:    Getting at least 3 servings of Calcium per day:  Yes    Bi-annual eye exam:  Yes    Dental care twice a year:  Yes    Sleep apnea or symptoms of sleep apnea:  None    Diet:  Regular (no restrictions)    Frequency of exercise:  2-3 days/week    Duration of exercise:  45-60 minutes    Taking medications regularly:  Yes    Barriers to taking medications:  None    Medication side effects:  None    PHQ-2 Total Score:    Additional concerns today:  Yes       -No history of GDM or GHTN.    IUD in place - requests removal.  Vasectomy for contraception with current partner    I have also seen her in the past for lichen     Today's PHQ-2 Score:   PHQ-2 (  [employer]) 2020   Q1: Little interest or pleasure in doing things 0   Q2: Feeling down, depressed or hopeless 0   PHQ-2 Score 0       Abuse: Current or Past (Physical, Sexual or Emotional) - No  Do you feel safe in your environment? Yes        Social History     Tobacco Use     Smoking status: Never Smoker     Smokeless tobacco: Never Used   Substance Use Topics     Alcohol use: Yes     Comment: rarely     If you drink alcohol do you typically have >3 drinks per day or >7 drinks per week? No        BP Readings from Last 3 Encounters:   20 134/74   20 100/60   19 114/75    Wt Readings from Last 3 Encounters:   20 66.7 kg (147 lb)   20 64.5 kg (142 lb 1.6 oz)   19 64.8 kg (142 lb 14.4 oz)                  Patient Active Problem List   Diagnosis     Gastroesophageal reflux disease without esophagitis     ASCUS with positive high risk HPV cervical     Past Surgical History:   Procedure Laterality Date      SECTION       COMBINED ESOPHAGOSCOPY, GASTROSCOPY, DUODENOSCOPY (EGD) WITH CO2 INSUFFLATION N/A 2019     Procedure: COMBINED ESOPHAGOSCOPY, GASTROSCOPY, DUODENOSCOPY (EGD) WITH CO2 INSUFFLATION;  Surgeon: Chacorta Nunez MD;  Location: [facility]     ESOPHAGOSCOPY, GASTROSCOPY, DUODENOSCOPY (EGD), COMBINED N/A 2/1/2019    Procedure: Combined Esophagoscopy, Gastroscopy, Duodenoscopy (Egd), Biopsy Single Or Multiple;  Surgeon: Chacorta Nunez MD;  Location: [facility]       Social History     Tobacco Use     Smoking status: Never Smoker     Smokeless tobacco: Never Used   Substance Use Topics     Alcohol use: Yes     Comment: rarely     Family History   Problem Relation Age of Onset     Arthritis Mother         fibromyalgia     Crohn's Disease Mother      Ulcerative Colitis Mother      Cerebrovascular Disease Maternal Grandfather          Current Outpatient Medications   Medication Sig Dispense Refill     omeprazole (PRILOSEC) 40 MG DR capsule TAKE 1 CAPSULE BY MOUTH EVERY DAY 90 capsule 3     FLUZONE QUADRIVALENT 0.5 ML injection ADM 0.5ML IM UTD       Allergies   Allergen Reactions     No Known Drug Allergies      No lab results found.     Mammogram Screening: Patient under age 50, mutual decision reflected in health maintenance.      History of abnormal Pap smear: YES - updated in Problem List and Health Maintenance accordingly  PAP / HPV Latest Ref Rng & Units 8/23/2019   PAP - NIL   HPV 16 DNA NEG:Negative Negative   HPV 18 DNA NEG:Negative Negative   OTHER HR HPV NEG:Negative Positive(A)       Review of Systems  CONSTITUTIONAL: NEGATIVE for fever, chills, change in weight  INTEGUMENTARU/SKIN: NEGATIVE for worrisome rashes, moles or lesions  EYES: NEGATIVE for vision changes or irritation  ENT: NEGATIVE for ear, mouth and throat problems  RESP: NEGATIVE for significant cough or SOB  BREAST: NEGATIVE for masses, tenderness or discharge  CV: NEGATIVE for chest pain, palpitations or peripheral edema  GI: NEGATIVE for nausea, abdominal pain, heartburn, or change in bowel habits  : NEGATIVE for unusual  "urinary or vaginal symptoms. Periods are light with the IUD.  MUSCULOSKELETAL: NEGATIVE for significant arthralgias or myalgia  NEURO: NEGATIVE for weakness, dizziness or paresthesias  PSYCHIATRIC: NEGATIVE for changes in mood or affect     OBJECTIVE:   /74 (BP Location: Right arm, Cuff Size: Adult Small)   Pulse 82   Ht 1.626 m (5' 4\")   Wt 66.7 kg (147 lb)   SpO2 100%   BMI 25.23 kg/m    Physical Exam  Gen: Alert and oriented times 3, no acute distress.  Well developed, well nourished, pleasant.    Neck: Supple, no masses.  No thyromegaly.  Breast: Symmetrical without lesions.  No dimpling, nipple discharge, or discrete masses.  No lymphadenopathy.  Chest:  Non labored.  Clear to auscultation bilaterally.    Heart: Regular, normal S1, S2.  No murmurs.   Abdomen: Soft, nontender, nondistended.  No hepatosplenomegaly.    :  Normal female external genitalia.  No lesions.  Urethral meatus normal.  Speculum exam reveals a normal vaginal vault, normal cervix with normal strings.  No abnormal discharge.  Bimanual exam reveals a normal, mobile, nontender uterus.  No cervical motion tenderness.  Adnexa nontender with no palpable masses.    Extremities:  Nontender, no edema.    Pap obtained:  Yes     ASSESSMENT/PLAN:       ICD-10-CM    1. Well female exam with routine gynecological exam  Z01.419    2. ASCUS with positive high risk HPV cervical  R87.610 Pap imaged thin layer diagnostic with HPV (select HPV order below)    R87.810 HPV High Risk Types DNA Cervical   3. CARDIOVASCULAR SCREENING; LDL GOAL LESS THAN 160  Z13.6 Lipid panel reflex to direct LDL Fasting   4. Screening for diabetes mellitus  Z13.1 **Glucose FUTURE anytime   5. Encounter for hepatitis C screening test for low risk patient  Z11.59 Hepatitis C antibody   6. Encounter for IUD removal  Z30.432 REMOVE INTRAUTERINE DEVICE       IUD - Plan removal today. See below.         Patient has been advised of split billing requirements and indicates " "understanding: Yes  COUNSELING:  Reviewed preventive health counseling, as reflected in patient instructions    Estimated body mass index is 25.23 kg/m  as calculated from the following:    Height as of this encounter: 1.626 m (5' 4\").    Weight as of this encounter: 66.7 kg (147 lb).        She reports that she has never smoked. She has never used smokeless tobacco.      Counseling Resources:  ATP IV Guidelines  Pooled Cohorts Equation Calculator  Breast Cancer Risk Calculator  BRCA-Related Cancer Risk Assessment: FHS-7 Tool  FRAX Risk Assessment  ICSI Preventive Guidelines  Dietary Guidelines for Americans, 2010  App [employer] MyPlate  ASA Prophylaxis  Lung CA Screening    Katt Rush MD  [facility] [place]      IUD REMOVAL    After verifying consent, the cervix was visualized using a speculum.  The IUD strings are visible.   The IUD was removed without difficulty and was verified to be intact.  The patient tolerated the procedure well.\  "

## 2020-12-24 LAB
COPATH REPORT: NORMAL
PAP: NORMAL

## 2020-12-28 LAB
FINAL DIAGNOSIS: NORMAL
HPV HR 12 DNA CVX QL NAA+PROBE: NEGATIVE
HPV16 DNA SPEC QL NAA+PROBE: NEGATIVE
HPV18 DNA SPEC QL NAA+PROBE: NEGATIVE
SPECIMEN DESCRIPTION: NORMAL
SPECIMEN SOURCE CVX/VAG CYTO: NORMAL

## 2021-01-08 DIAGNOSIS — Z11.59 ENCOUNTER FOR HEPATITIS C SCREENING TEST FOR LOW RISK PATIENT: ICD-10-CM

## 2021-01-08 DIAGNOSIS — Z13.6 CARDIOVASCULAR SCREENING; LDL GOAL LESS THAN 160: ICD-10-CM

## 2021-01-08 DIAGNOSIS — Z13.1 SCREENING FOR DIABETES MELLITUS: ICD-10-CM

## 2021-01-08 LAB
GLUCOSE SERPL-MCNC: 90 MG/DL (ref 70–99)
HCV AB SERPL QL IA: NONREACTIVE

## 2021-01-08 PROCEDURE — 36415 COLL VENOUS BLD VENIPUNCTURE: CPT | Performed by: OBSTETRICS & GYNECOLOGY

## 2021-01-08 PROCEDURE — 86803 HEPATITIS C AB TEST: CPT | Performed by: OBSTETRICS & GYNECOLOGY

## 2021-01-08 PROCEDURE — 82947 ASSAY GLUCOSE BLOOD QUANT: CPT | Performed by: OBSTETRICS & GYNECOLOGY

## 2021-01-08 PROCEDURE — 80061 LIPID PANEL: CPT | Performed by: OBSTETRICS & GYNECOLOGY

## 2021-01-11 LAB
CHOLEST SERPL-MCNC: 179 MG/DL
HDLC SERPL-MCNC: 53 MG/DL
LDLC SERPL CALC-MCNC: 113 MG/DL
NONHDLC SERPL-MCNC: 126 MG/DL
TRIGL SERPL-MCNC: 66 MG/DL

## 2021-03-27 DIAGNOSIS — K21.9 GASTROESOPHAGEAL REFLUX DISEASE WITHOUT ESOPHAGITIS: ICD-10-CM

## 2021-03-29 RX ORDER — OMEPRAZOLE 40 MG/1
CAPSULE, DELAYED RELEASE ORAL
Qty: 30 CAPSULE | Refills: 0 | Status: SHIPPED | OUTPATIENT
Start: 2021-03-29 | End: 2021-04-26

## 2021-03-29 NOTE — TELEPHONE ENCOUNTER
Routing refill request to provider for review/approval because:  Gwendolyn given x1 and patient did not follow up, please advise  Madeline Velarde RN

## 2021-04-24 DIAGNOSIS — K21.9 GASTROESOPHAGEAL REFLUX DISEASE WITHOUT ESOPHAGITIS: ICD-10-CM

## 2021-04-26 RX ORDER — OMEPRAZOLE 40 MG/1
CAPSULE, DELAYED RELEASE ORAL
Qty: 30 CAPSULE | Refills: 0 | Status: SHIPPED | OUTPATIENT
Start: 2021-04-26 | End: 2021-10-11

## 2021-09-18 ENCOUNTER — HEALTH MAINTENANCE LETTER (OUTPATIENT)
Age: 41
End: 2021-09-18

## 2021-10-11 ENCOUNTER — OFFICE VISIT (OUTPATIENT)
Dept: OBGYN | Facility: CLINIC | Age: 41
End: 2021-10-11
Payer: COMMERCIAL

## 2021-10-11 VITALS
BODY MASS INDEX: 24.86 KG/M2 | HEIGHT: 64 IN | HEART RATE: 78 BPM | DIASTOLIC BLOOD PRESSURE: 72 MMHG | WEIGHT: 145.6 LBS | OXYGEN SATURATION: 100 % | SYSTOLIC BLOOD PRESSURE: 122 MMHG

## 2021-10-11 DIAGNOSIS — N93.9 ABNORMAL UTERINE BLEEDING (AUB): Primary | ICD-10-CM

## 2021-10-11 LAB
HGB BLD-MCNC: 13.9 G/DL (ref 11.7–15.7)
PROLACTIN SERPL-MCNC: 14 UG/L (ref 3–27)
TSH SERPL DL<=0.005 MIU/L-ACNC: 1.24 MU/L (ref 0.4–4)

## 2021-10-11 PROCEDURE — 84146 ASSAY OF PROLACTIN: CPT | Performed by: OBSTETRICS & GYNECOLOGY

## 2021-10-11 PROCEDURE — 36415 COLL VENOUS BLD VENIPUNCTURE: CPT | Performed by: OBSTETRICS & GYNECOLOGY

## 2021-10-11 PROCEDURE — 99214 OFFICE O/P EST MOD 30 MIN: CPT | Performed by: OBSTETRICS & GYNECOLOGY

## 2021-10-11 PROCEDURE — 84443 ASSAY THYROID STIM HORMONE: CPT | Performed by: OBSTETRICS & GYNECOLOGY

## 2021-10-11 PROCEDURE — 85018 HEMOGLOBIN: CPT | Performed by: OBSTETRICS & GYNECOLOGY

## 2021-10-11 RX ORDER — ONDANSETRON 4 MG/1
TABLET, ORALLY DISINTEGRATING ORAL
COMMUNITY
Start: 2021-05-01 | End: 2021-10-11

## 2021-10-11 ASSESSMENT — MIFFLIN-ST. JEOR: SCORE: 1300.44

## 2021-10-11 NOTE — PROGRESS NOTES
"OB/GYN      NAME:  Zulma Coronado  PCP:  No Ref-Primary, Physician  MRN:  [id number]    Impression / Plan     41 year old  with:      ICD-10-CM    1. Abnormal uterine bleeding (AUB)  N93.9 US Pelvic Complete with Transvaginal     TSH with free T4 reflex     Hemoglobin     Prolactin     TSH with free T4 reflex     Hemoglobin     Prolactin       Discussed possible etiologies for the abnormal bleeding.  Plan labs and ultrasound.  I will contact her with the results and follow-up recommendations.    Plans to consider IUD if everything is normal. This can be done at her annual exam.    Chief Complaint     Chief Complaint   Patient presents with     Consult     Abnormal Uterine Bleeding       HPI     Zulma Coronado is a  41 year old female who is seen for abnormal uterine bleeding     Patient's last menstrual period was 2021 (exact date).     She had the IUD removed in December after Mirena for 10 years.  Had birth control pills before than.     Periods were regular, every 28 days, until 2021.      Since April, started bleeding every 20 days.  Watery bright red bleeding that is heavy, usually doesn't last longer than a few hours. Bleeds through a tampon, pad, through her clothes, runs down her leg. Then spots for a couple days.  3 days total.  No pain associated with it.    Was seen in urgent care for the bleeding.  Hemoglobin was normal at that time.    This last period was more normal, lasted 7 days.  No abnormal cramping.       Problem List     Patient Active Problem List    Diagnosis Date Noted     ASCUS with positive high risk HPV cervical 2019     Priority: Medium     2018 ASCUS, + hr HPV  at [facility]  2018 [place]: Negative  At [facility]  Plan: Cotesting 19 NIL pap, + HR HPV (not 16 or 18). Plan: [place]  19 [place] ECC \"cervicitis and epithelial atypia\". Plan: cotest due 20 NIL Pap, Neg HR HPV. Plan: Cotest in 3 years.         Gastroesophageal reflux " "disease without esophagitis 01/19/2019     Priority: Medium       Medications     Current Outpatient Medications   Medication     famotidine (PEPCID) 20 MG tablet     omeprazole (PRILOSEC) 40 MG DR capsule     ondansetron (ZOFRAN-ODT) 4 MG ODT tab     No current facility-administered medications for this visit.        Allergies     Allergies   Allergen Reactions     No Known Drug Allergies        ROS     A  organ review of systems was asked and the pertinent positives and negatives are listed in the HPI.     Physical Exam   Vitals: /72 (BP Location: Right arm, Cuff Size: Adult Regular)   Pulse 78   Ht 1.626 m (5' 4\")   Wt 66 kg (145 lb 9.6 oz)   LMP 09/20/2021 (Exact Date)   SpO2 100%   Breastfeeding No   BMI 24.99 kg/m      General: Comfortable, no obvious distress  Neck: Trachea midline. Thyroid is normal in size and texture.  Psych: Alert and orientated x 3. Appropriate affect, good insight.   : deferred      Labs/Imaging       I have personally reviewed the labs/imaging and the findings were:    hgb 5/1/2021 13.9        1 undiagnosed new problem with, ordered labs and ultrasound.    Katt Rush MD     "

## 2021-10-18 ENCOUNTER — ANCILLARY PROCEDURE (OUTPATIENT)
Dept: MAMMOGRAPHY | Facility: CLINIC | Age: 41
End: 2021-10-18
Payer: COMMERCIAL

## 2021-10-18 DIAGNOSIS — Z12.31 VISIT FOR SCREENING MAMMOGRAM: ICD-10-CM

## 2021-10-18 PROCEDURE — 77067 SCR MAMMO BI INCL CAD: CPT

## 2021-10-18 PROCEDURE — 77063 BREAST TOMOSYNTHESIS BI: CPT

## 2021-10-20 ENCOUNTER — ANCILLARY PROCEDURE (OUTPATIENT)
Dept: ULTRASOUND IMAGING | Facility: CLINIC | Age: 41
End: 2021-10-20
Attending: OBSTETRICS & GYNECOLOGY
Payer: COMMERCIAL

## 2021-10-20 DIAGNOSIS — N93.9 ABNORMAL UTERINE BLEEDING (AUB): ICD-10-CM

## 2021-10-20 PROCEDURE — 76856 US EXAM PELVIC COMPLETE: CPT | Performed by: RADIOLOGY

## 2021-10-20 PROCEDURE — 76830 TRANSVAGINAL US NON-OB: CPT | Performed by: RADIOLOGY

## 2021-10-20 NOTE — RESULT ENCOUNTER NOTE
Hi,    Your ultrasound is normal.  Please let me know if you have any questions or concerns. You may schedule your annual at your convenience, just let us know if you would like the IUD placed at the same time.     Thanks!  Dr. Rush

## 2021-11-17 ENCOUNTER — NURSE TRIAGE (OUTPATIENT)
Dept: FAMILY MEDICINE | Facility: CLINIC | Age: 41
End: 2021-11-17
Payer: COMMERCIAL

## 2021-11-17 ENCOUNTER — VIRTUAL VISIT (OUTPATIENT)
Dept: FAMILY MEDICINE | Facility: CLINIC | Age: 41
End: 2021-11-17
Payer: COMMERCIAL

## 2021-11-17 DIAGNOSIS — J01.90 ACUTE SINUSITIS WITH SYMPTOMS > 10 DAYS: Primary | ICD-10-CM

## 2021-11-17 DIAGNOSIS — R20.2 TINGLING OF UPPER EXTREMITY: ICD-10-CM

## 2021-11-17 DIAGNOSIS — M62.838 NECK MUSCLE SPASM: ICD-10-CM

## 2021-11-17 PROCEDURE — 99214 OFFICE O/P EST MOD 30 MIN: CPT | Mod: GT | Performed by: FAMILY MEDICINE

## 2021-11-17 RX ORDER — CYCLOBENZAPRINE HCL 5 MG
5 TABLET ORAL
Qty: 30 TABLET | Refills: 0 | Status: SHIPPED | OUTPATIENT
Start: 2021-11-17 | End: 2021-12-20

## 2021-11-17 NOTE — TELEPHONE ENCOUNTER
"Advised patient to schedule visit today or tomorrow.  Scheduled virtual visit today.  Advised patient to seek emergency care per protocol.  Patient stated understanding.    Reason for Disposition    Patient wants to be seen    Additional Information    Negative: Shock suspected (e.g., cold/pale/clammy skin, too weak to stand, low BP, rapid pulse)    Negative: Similar pain previously and it was from 'heart attack'    Negative: Similar pain previously from 'angina' and not relieved by nitroglycerin    Negative: Sounds like a life-threatening emergency to the triager    Negative: Followed an injury to arm    Negative: Chest pain    Negative: Wound looks infected    Negative: Elbow pain is the main symptom    Negative: Hand or wrist pain is the main symptom    Negative: Difficulty breathing or unusual sweating (e.g., sweating without exertion)    Negative: Chest pain lasting longer than 5 minutes    Negative: Age > 40 and no obvious cause for pain, pain still present even when not moving the arm    Negative: Fever and red area (or area very tender to touch)    Negative: Fever and swollen joint    Negative: Entire arm is swollen    Negative: Patient sounds very sick or weak to the triager    Negative: SEVERE pain (e.g., excruciating, unable to do any normal activities)    Negative: Red area or streak and large (> 2 in. or 5 cm)    Negative: Cast on wrist or arm and now increasing pain    Negative: Weakness (i.e., loss of strength) in hand or fingers    Negative: Arm pains with exertion (e.g., occurs with walking; goes away on resting)    Negative: Painful rash with multiple small blisters grouped together (i.e., dermatomal distribution or 'band' or 'stripe')    Negative: Looks like a boil, infected sore, deep ulcer, or other infected rash (spreading redness, pus)    Negative: Localized rash is very painful (no fever)    Answer Assessment - Initial Assessment Questions  1. ONSET: \"When did the pain start?\"      " "Approximately 4 days ago    2. LOCATION: \"Where is the pain located?\"      Both arms intermittently     3. PAIN: \"How bad is the pain?\" (Scale 1-10; or mild, moderate, severe)    - MILD (1-3): doesn't interfere with normal activities    - MODERATE (4-7): interferes with normal activities (e.g., work or school) or awakens from sleep    - SEVERE (8-10): excruciating pain, unable to do any normal activities, unable to hold a cup of water      Mild - more tingling    4. WORK OR EXERCISE: \"Has there been any recent work or exercise that involved this part of the body?\"      No    5. CAUSE: \"What do you think is causing the arm pain?\"      Maybe pinched nerve    6. OTHER SYMPTOMS: \"Do you have any other symptoms?\" (e.g., neck pain, swelling, rash, fever, numbness, weakness)      No    7. PREGNANCY: \"Is there any chance you are pregnant?\" \"When was your last menstrual period?\"      Unknown    Protocols used: ARM PAIN-A-OH  Melissa España RN      "

## 2021-11-17 NOTE — PROGRESS NOTES
Zulma is a 41 year old who is being evaluated via a billable video visit.      How would you like to obtain your AVS? MyChart  If the video visit is dropped, the invitation should be resent by: Text to cell phone: see epic  Will anyone else be joining your video visit? No    Video Start Time: 4:04 PM    Assessment & Plan     Acute sinusitis with symptoms > 10 days  Recommended to push fluids, warm face compresses, rest, tylenol prn for pain, wet steam inhalation and RTC in 1week if no better by then or sooner prn.  Dosing and potential medication side effects discussed.    - amoxicillin-clavulanate (AUGMENTIN) 875-125 MG tablet; Take 1 tablet by mouth 2 times daily    Neck muscle spasm  Recommended to take Paxil 5 mg at bedtime for the next 2 nights and then as needed for neck stiffness or neck spasm follow-up if no improvement noted in 1 week or sooner if needed    - cyclobenzaprine (FLEXERIL) 5 MG tablet; Take 1 tablet (5 mg) by mouth nightly as needed for muscle spasms    Tingling of upper extremity  ddx-cervical radiculopathy/anxiety  With a recent lifting luggage is doing flying after which patient started having symptoms it is likely that it could be, the neck strain or possible cervical disc concern we will treat the underlying sinus infection and neck spasm as mentioned above  If no improvement noted by this weekend, patient understands to call for in person visit with PCP Isabelle Narvaez or with myself next week.  If symptoms get worse or new neurological symptoms arise, patient understands to be seen in the emergency room right away  Patient verbalised understanding and is agreeable to the plan.  Chart forwarded to PCP for FYI           :[id number]}     Chart documentation done in part with Dragon Voice recognition Software. Although reviewed after completion, some word and grammatical error may remain.    See Patient Instructions    Return in about 5 days (around 11/22/2021), or if symptoms worsen or fail to  improve.    Emily Guo MD  [facility] FARHANA Maya is a 41 year old who presents for the following health issues     Patient is here for a video visit instead of in person visit due to the current COVID-19 pandemic.  Patient is new to the provider  She is here today with concerns of having new onset intermittent tingling in both upper arms, that comes and goes since last Saturday after she flew  Patient reported lifting leg edges to put in the overhead Bin while flying.  Denies previous similar symptoms, numbness or weakness or lower extremity symptoms, dizziness, new onset of bladder or bowel incontinence.  She does report having ongoing anxiety, tends to have tensor neck muscles.  She went to her chiropractor this morning for neck massages  She is also having severe pressure and pain over the sinus areas on the face associated with plugged sensation in both ears, right worse than the left, and stuffy nose but denies nasal drainage, sore throat, postnasal drip, cough, shortness of breath, fever, chills.  Denies abnormal skin rashes  Does not smoke. Has no h/o asthnma or allergies.  Patient denies previous history of disc problems in the neck of the back  She denies problems with sleep      HPI         Review of Systems   CONSTITUTIONAL: NEGATIVE for fever, chills, change in weight  INTEGUMENTARY/SKIN: NEGATIVE for worrisome rashes, moles or lesions  EYES: NEGATIVE for vision changes or irritation  ENT/MOUTH: as above  RESP: NEGATIVE for significant cough or SOB  CV: NEGATIVE for chest pain, palpitations or peripheral edema  MUSCULOSKELETAL: as above  NEURO: as above  PSYCHIATRIC: anxiety      Objective           Vitals:  No vitals were obtained today due to virtual visit.    Physical Exam   GENERAL: Healthy, alert and no distress  EYES: Eyes grossly normal to inspection  RESP: No audible wheeze, cough, or visible cyanosis.  No visible retractions or increased work of  breathing.    MS: Normal active range of motion  SKIN: Visible skin clear. No significant rash, abnormal pigmentation or lesions.  NEURO: Cranial nerves grossly intact.  Mentation and speech appropriate for age.  PSYCH: Mentation appears normal, affect normal/bright, judgement and insight intact, normal speech and appearance well-groomed.                Video-Visit Details    Type of service:  Video Visit    Video End Time:4:13 PM    Originating Location (pt. Location): Home    Distant Location (provider location):  [facility]     Platform used for Video Visit: [email or web address]

## 2021-12-20 ENCOUNTER — OFFICE VISIT (OUTPATIENT)
Dept: FAMILY MEDICINE | Facility: CLINIC | Age: 41
End: 2021-12-20
Payer: COMMERCIAL

## 2021-12-20 VITALS
WEIGHT: 145.2 LBS | DIASTOLIC BLOOD PRESSURE: 82 MMHG | SYSTOLIC BLOOD PRESSURE: 129 MMHG | RESPIRATION RATE: 18 BRPM | BODY MASS INDEX: 24.79 KG/M2 | HEART RATE: 90 BPM | OXYGEN SATURATION: 100 % | HEIGHT: 64 IN | TEMPERATURE: 98.8 F

## 2021-12-20 DIAGNOSIS — F41.1 GENERALIZED ANXIETY DISORDER: Primary | ICD-10-CM

## 2021-12-20 PROCEDURE — 99213 OFFICE O/P EST LOW 20 MIN: CPT | Performed by: NURSE PRACTITIONER

## 2021-12-20 ASSESSMENT — PATIENT HEALTH QUESTIONNAIRE - PHQ9
5. POOR APPETITE OR OVEREATING: MORE THAN HALF THE DAYS
SUM OF ALL RESPONSES TO PHQ QUESTIONS 1-9: 1

## 2021-12-20 ASSESSMENT — ANXIETY QUESTIONNAIRES
6. BECOMING EASILY ANNOYED OR IRRITABLE: NOT AT ALL
3. WORRYING TOO MUCH ABOUT DIFFERENT THINGS: MORE THAN HALF THE DAYS
7. FEELING AFRAID AS IF SOMETHING AWFUL MIGHT HAPPEN: SEVERAL DAYS
5. BEING SO RESTLESS THAT IT IS HARD TO SIT STILL: SEVERAL DAYS
2. NOT BEING ABLE TO STOP OR CONTROL WORRYING: MORE THAN HALF THE DAYS
1. FEELING NERVOUS, ANXIOUS, OR ON EDGE: NEARLY EVERY DAY
GAD7 TOTAL SCORE: 11
IF YOU CHECKED OFF ANY PROBLEMS ON THIS QUESTIONNAIRE, HOW DIFFICULT HAVE THESE PROBLEMS MADE IT FOR YOU TO DO YOUR WORK, TAKE CARE OF THINGS AT HOME, OR GET ALONG WITH OTHER PEOPLE: SOMEWHAT DIFFICULT

## 2021-12-20 ASSESSMENT — PAIN SCALES - GENERAL: PAINLEVEL: NO PAIN (0)

## 2021-12-20 ASSESSMENT — MIFFLIN-ST. JEOR: SCORE: 1298.62

## 2021-12-20 NOTE — PROGRESS NOTES
Derrell Maya is a 41 year old who presents for the following health issues     HPI     Anxiety Follow-Up    How are you doing with your anxiety since your last visit? Worsened     Are you having other symptoms that might be associated with anxiety? Yes:  feels confined, tingling in arms    Have you had a significant life event? Health Concerns     Are you feeling depressed? No    Do you have any concerns with your use of alcohol or other drugs? No  Has always had an element of anxiety however recently worse  Seemed to shift after covid and then  A recent plane ride had a bad anxiety attack   Went into the ER and also was noted anxiety   Daughter also was recently diagnosis of ulcerative colitis so that is stressful   Has never had to take anything for this before   Has been to counseling and has a lot of tools of kit but feels like she would be willing to try something now   When really anxious will have tension in her whole upper back   Social History     Tobacco Use     Smoking status: Never Smoker     Smokeless tobacco: Never Used   Vaping Use     Vaping Use: Never used   Substance Use Topics     Alcohol use: Yes     Comment: rarely     Drug use: No       Lab work is in process  Labs reviewed in EPIC  BP Readings from Last 3 Encounters:   21 129/82   10/11/21 122/72   20 134/74    Wt Readings from Last 3 Encounters:   21 65.9 kg (145 lb 3.2 oz)   10/11/21 66 kg (145 lb 9.6 oz)   20 66.7 kg (147 lb)                  Patient Active Problem List   Diagnosis     Gastroesophageal reflux disease without esophagitis     ASCUS with positive high risk HPV cervical     Past Surgical History:   Procedure Laterality Date      SECTION       COMBINED ESOPHAGOSCOPY, GASTROSCOPY, DUODENOSCOPY (EGD) WITH CO2 INSUFFLATION N/A 2019    Procedure: COMBINED ESOPHAGOSCOPY, GASTROSCOPY, DUODENOSCOPY (EGD) WITH CO2 INSUFFLATION;  Surgeon: Chacorta Nunez MD;  Location:  OR      "ESOPHAGOSCOPY, GASTROSCOPY, DUODENOSCOPY (EGD), COMBINED N/A 2/1/2019    Procedure: Combined Esophagoscopy, Gastroscopy, Duodenoscopy (Egd), Biopsy Single Or Multiple;  Surgeon: Chacorta Nunez MD;  Location:  OR       Social History     Tobacco Use     Smoking status: Never Smoker     Smokeless tobacco: Never Used   Substance Use Topics     Alcohol use: Yes     Comment: rarely     Family History   Problem Relation Age of Onset     Arthritis Mother         fibromyalgia     Crohn's Disease Mother      Ulcerative Colitis Mother      Cerebrovascular Disease Maternal Grandfather          Current Outpatient Medications   Medication Sig Dispense Refill     famotidine (PEPCID) 20 MG tablet Take 1 tablet (20 mg) by mouth 2 times daily 180 tablet 3     Allergies   Allergen Reactions     No Known Drug Allergies      Review of Systems   Constitutional, HEENT, cardiovascular, pulmonary, gi and gu systems are negative, except as otherwise noted.      Objective    /82 (BP Location: Right arm, Patient Position: Sitting, Cuff Size: Adult Regular)   Pulse 90   Temp 98.8  F (37.1  C) (Oral)   Resp 18   Ht 1.626 m (5' 4\")   Wt 65.9 kg (145 lb 3.2 oz)   LMP 12/01/2021   SpO2 100%   BMI 24.92 kg/m    Body mass index is 24.92 kg/m .  Physical Exam   GENERAL APPEARANCE: healthy, alert and no distress  NECK: no adenopathy  RESP: lungs clear to auscultation - no rales, rhonchi or wheezes  CV: regular rates and rhythm and no murmur, click or rub  MS: extremities normal- no gross deformities noted  SKIN: no suspicious lesions or rashes  NEURO: Normal strength and tone, mentation intact and speech normal  PSYCH: mentation appears normal and affect normal/bright  MENTAL STATUS EXAM:  Appearance/Behavior: No apparent distress, Casually groomed and Dressed appropriately for weather  Speech: Normal  Mood/Affect: normal affect and anxiety  Insight: Adequate    Diagnostic Test Results:  Labs reviewed in Epic  none "     Assessment & Plan     Generalized anxiety disorder  Discussed the pathophysiology of anxiety episodes and the various symptoms seen associated with anxiety episodes.  Discussed possible triggers including fatigue, depression, stress, and chemicals such as alcohol, caffeine and certain drugs.  Discussed the treatment including an aerobic exercise program, adequate rest, and both rescue meds and maintenance meds.  Initiate medication with Zoloft  50 mg q day  Reviewed concept of depression as function of biochemical imbalance of neurotransmitters/rationale for treatment.  Risks and benefits of medication(s) reviewed with patient.  Questions answered.  Counseling advised  Followup appointment in 1 month(s)  Patient instructed to call for significant side effects medications or problems  Patient advised immediate presentation to hospital for suicidal thought, etc.  - sertraline (ZOLOFT) 50 MG tablet  Dispense: 30 tablet; Refill: 1         See Patient Instructions  Patient Instructions     PLAN:   1.   Symptomatic therapy suggested: will start half tablet a day for first 4 to 6 days and then will increase to a whole tablet a day  2.  Orders Placed This Encounter   Medications     sertraline (ZOLOFT) 50 MG tablet     Sig: Take 1 tablet (50 mg) by mouth daily     Dispense:  30 tablet     Refill:  1     3. Patient needs to follow up in if no improvement,or sooner if worsening of symptoms or other symptoms develop.  Follow up in 1 month.      Return in about 1 month (around 1/20/2022), or if symptoms worsen or fail to improve.    ROMA Gallagher [facility]

## 2021-12-20 NOTE — PATIENT INSTRUCTIONS
PLAN:   1.   Symptomatic therapy suggested: will start half tablet a day for first 4 to 6 days and then will increase to a whole tablet a day  2.  Orders Placed This Encounter   Medications     sertraline (ZOLOFT) 50 MG tablet     Sig: Take 1 tablet (50 mg) by mouth daily     Dispense:  30 tablet     Refill:  1     3. Patient needs to follow up in if no improvement,or sooner if worsening of symptoms or other symptoms develop.  Follow up in 1 month.

## 2021-12-21 ASSESSMENT — ANXIETY QUESTIONNAIRES: GAD7 TOTAL SCORE: 11

## 2021-12-25 ENCOUNTER — MYC MEDICAL ADVICE (OUTPATIENT)
Dept: FAMILY MEDICINE | Facility: CLINIC | Age: 41
End: 2021-12-25
Payer: COMMERCIAL

## 2021-12-25 DIAGNOSIS — F41.1 GENERALIZED ANXIETY DISORDER: Primary | ICD-10-CM

## 2021-12-27 ENCOUNTER — NURSE TRIAGE (OUTPATIENT)
Dept: FAMILY MEDICINE | Facility: CLINIC | Age: 41
End: 2021-12-27
Payer: COMMERCIAL

## 2021-12-27 DIAGNOSIS — F41.1 GENERALIZED ANXIETY DISORDER: Primary | ICD-10-CM

## 2021-12-27 RX ORDER — BUSPIRONE HYDROCHLORIDE 5 MG/1
5 TABLET ORAL 3 TIMES DAILY
Qty: 60 TABLET | Refills: 1 | Status: SHIPPED | OUTPATIENT
Start: 2021-12-27 | End: 2022-01-31

## 2021-12-27 RX ORDER — HYDROXYZINE HYDROCHLORIDE 25 MG/1
25-50 TABLET, FILM COATED ORAL EVERY 8 HOURS PRN
Qty: 30 TABLET | Refills: 0 | Status: SHIPPED | OUTPATIENT
Start: 2021-12-27 | End: 2022-01-31

## 2021-12-27 NOTE — TELEPHONE ENCOUNTER
Called and spoke to Zulma and read her the Providers message as written. Zulma would like to try the hydroxyzine and to have it sent to the [facility] in [place].     Mitali Burden RN BSN

## 2021-12-27 NOTE — TELEPHONE ENCOUNTER
OK to stop the zoloft and see if that helps  We can try some hydroxyzine and see if that helps with the anxiety  Worsening symptoms will need to be evaluated

## 2021-12-27 NOTE — TELEPHONE ENCOUNTER
"Patient is calling due to concerns of tingling, numbness, and anxiety.    States she has had numbness and tingling that started 6-7 weeks ago. It is in her arms, legs, back, and trunk. Describes as \"pins and needles.\" Experiencing tightness in arms. At times feels warm sensation under skin on neck and back. Symptoms have come and gone over the past 6-7 weeks but have recently gotten worse in the night time.     Denies chest pain, jaw pain, neck pain, headache, dizziness, lightheadedness, and weakness.    She was seen by primary care provider a week ago on 12/20 for anxiety and arm tingling. Provider documentation incomplete, but patient states she discussed her symptoms with provider at last visit and patient thought that if she could get her anxiety under control, the numbness and tingling would subside. However, she now feels there is something more going on.  Was started on zoloft on 12/20 at primary care provider appointment. States she stopped taking zoloft yesterday because she feels the medication is making her symptoms worse.    Was also seen on 11/19 in urgent care for arm tingling, panic attack, nerve pain.     Advised patient to be seen in ER if develops chest pain, shortness of breath, sudden weakness, garbled speech, pain.     Routing to provider to review and advise.    Cheryle Braga RN  [facility]    Reason for Disposition    Weakness of arm or leg is a chronic symptom (recurrent or ongoing problem lasting > 4 weeks)    Additional Information    Negative: Difficult to awaken or acting confused (e.g., disoriented, slurred speech)    Negative: New neurologic deficit that is present NOW, sudden onset of ANY of the following: * Weakness of the face, arm, or leg on one side of the body* Numbness of the face, arm, or leg on one side of the body* Loss of speech or garbled speech    Negative: Sounds like a life-threatening emergency to the triager    Negative: Confusion, " "disorientation, or hallucinations is the main symptom    Negative: Dizziness is the main symptom    Negative: Followed a head injury within last 3 days    Negative: Headache (with neurologic deficit)    Negative: Unable to urinate (or only a few drops) and bladder feels very full    Negative: Loss of control of bowel or bladder (i.e., incontinence) of new onset    Negative: Back pain with numbness (loss of sensation) in groin or rectal area    Negative: Patient sounds very sick or weak to the triager    Negative: Neurologic deficit that was brief (now gone), ANY of the following: * Weakness of the face, arm, or leg on one side of the body * Numbness of the face, arm, or leg on one side of the body * Loss of speech or garbled speech    Negative: Breeden palsy suspected (i.e., weakness only one side of the face, developing over hours to days, no other symptoms)    Negative: Tingling (e.g., pins and needles) of the face, arm or leg on one side of the body, that is  present now (Exceptions: chronic/recurrent symptom lasting > 4 weeks or tingling from known cause, such as: bumped elbow, carpal tunnel syndrome, pinched nerve, frostbite)    Negative: Neurologic deficit of gradual onset, ANY of the following: * Weakness of the face, arm, or leg on one side of the body * Numbness of the face, arm, or leg on one side of the body * Loss of speech or garbled speech    Negative: Back pain (with neurologic deficit)    Negative: Neck pain (with neurologic deficit)    Negative: Patient wants to be seen    Negative: Loss of speech or garbled speech is a chronic symptom (recurrent or ongoing problem lasting > 4 weeks)    Answer Assessment - Initial Assessment Questions  1. SYMPTOM: \"What is the main symptom you are concerned about?\" (e.g., weakness, numbness)      Numbness and tingling to arms, anxiety is part of it.  2. ONSET: \"When did this start?\" (minutes, hours, days; while sleeping)      6-7 weeks  3. LAST NORMAL: \"When was the " "last time you were normal (no symptoms)?\"      7 weeks  4. PATTERN \"Does this come and go, or has it been constant since it started?\"  \"Is it present now?\"      Comes and goes  5. CARDIAC SYMPTOMS: \"Have you had any of the following symptoms: chest pain, difficulty breathing, palpitations?\"      No  6. NEUROLOGIC SYMPTOMS: \"Have you had any of the following symptoms: headache, dizziness, vision loss, double vision, changes in speech, unsteady on your feet?\"      No  7. OTHER SYMPTOMS: \"Do you have any other symptoms?\"      See note  8. PREGNANCY: \"Is there any chance you are pregnant?\" \"When was your last menstrual period?\"      No    Protocols used: NEUROLOGIC DEFICIT-A-[place]      "

## 2022-01-31 ENCOUNTER — OFFICE VISIT (OUTPATIENT)
Dept: OBGYN | Facility: CLINIC | Age: 42
End: 2022-01-31
Payer: COMMERCIAL

## 2022-01-31 VITALS
HEIGHT: 64 IN | HEART RATE: 98 BPM | OXYGEN SATURATION: 99 % | SYSTOLIC BLOOD PRESSURE: 130 MMHG | DIASTOLIC BLOOD PRESSURE: 73 MMHG | WEIGHT: 137.7 LBS | BODY MASS INDEX: 23.51 KG/M2

## 2022-01-31 DIAGNOSIS — Z30.430 ENCOUNTER FOR INSERTION OF INTRAUTERINE CONTRACEPTIVE DEVICE: ICD-10-CM

## 2022-01-31 DIAGNOSIS — Z01.419 WELL FEMALE EXAM WITH ROUTINE GYNECOLOGICAL EXAM: Primary | ICD-10-CM

## 2022-01-31 PROCEDURE — 99396 PREV VISIT EST AGE 40-64: CPT | Mod: 25 | Performed by: OBSTETRICS & GYNECOLOGY

## 2022-01-31 PROCEDURE — 58300 INSERT INTRAUTERINE DEVICE: CPT | Performed by: OBSTETRICS & GYNECOLOGY

## 2022-01-31 ASSESSMENT — MIFFLIN-ST. JEOR: SCORE: 1260.63

## 2022-01-31 NOTE — PROGRESS NOTES
SUBJECTIVE:   CC: Zulma Coronado is an 41 year old woman who presents for preventive health visit.       Patient has been advised of split billing requirements and indicates understanding: Yes  Healthy Habits:     Getting at least 3 servings of Calcium per day:  Yes    Bi-annual eye exam:  Yes    Dental care twice a year:  Yes    Sleep apnea or symptoms of sleep apnea:  None    Diet:  Regular (no restrictions)    Frequency of exercise:  2-3 days/week    Duration of exercise:  15-30 minutes    Taking medications regularly:  Yes    Medication side effects:  Not applicable    PHQ-2 Total Score: 0    Additional concerns today:  Yes  IUD       No history of GDM or GHTN.  Vasectomy for contraception with current partner    I saw the patient 10/11/2021 for evaluation of abnormal uterine bleeding.  Labs were done.  She would like an IUD for management of bleeding.  She has used the Mirena for 10 years and had it removed 2020.    I have also seen her in the past for lichen     Today's PHQ-2 Score:   PHQ-2 (  [employer]) 2022   Q1: Little interest or pleasure in doing things 0   Q2: Feeling down, depressed or hopeless 0   PHQ-2 Score 0   PHQ-2 Total Score (12-17 Years)- Positive if 3 or more points; Administer PHQ-A if positive -   Q1: Little interest or pleasure in doing things Not at all   Q2: Feeling down, depressed or hopeless Not at all   PHQ-2 Score 0       Abuse: Current or Past (Physical, Sexual or Emotional) - No  Do you feel safe in your environment? Yes        Social History     Tobacco Use     Smoking status: Never Smoker     Smokeless tobacco: Never Used   Substance Use Topics     Alcohol use: Yes     Comment: rarely     If you drink alcohol do you typically have >3 drinks per day or >7 drinks per week? No    No flowsheet data found.    BP Readings from Last 3 Encounters:   22 130/73   21 129/82   10/11/21 122/72    Wt Readings from Last 3 Encounters:   22 62.5 kg (137  lb 11.2 oz)   21 65.9 kg (145 lb 3.2 oz)   10/11/21 66 kg (145 lb 9.6 oz)                  Patient Active Problem List   Diagnosis     Gastroesophageal reflux disease without esophagitis     ASCUS with positive high risk HPV cervical     Past Surgical History:   Procedure Laterality Date      SECTION       COMBINED ESOPHAGOSCOPY, GASTROSCOPY, DUODENOSCOPY (EGD) WITH CO2 INSUFFLATION N/A 2019    Procedure: COMBINED ESOPHAGOSCOPY, GASTROSCOPY, DUODENOSCOPY (EGD) WITH CO2 INSUFFLATION;  Surgeon: Chacorta Nunez MD;  Location: [facility] OR     ESOPHAGOSCOPY, GASTROSCOPY, DUODENOSCOPY (EGD), COMBINED N/A 2019    Procedure: Combined Esophagoscopy, Gastroscopy, Duodenoscopy (Egd), Biopsy Single Or Multiple;  Surgeon: Chacorta Nunez MD;  Location: [facility] OR       Social History     Tobacco Use     Smoking status: Never Smoker     Smokeless tobacco: Never Used   Substance Use Topics     Alcohol use: Yes     Comment: rarely     Family History   Problem Relation Age of Onset     Arthritis Mother         fibromyalgia     Crohn's Disease Mother      Ulcerative Colitis Mother      Cerebrovascular Disease Maternal Grandfather          Current Outpatient Medications   Medication Sig Dispense Refill     famotidine (PEPCID) 20 MG tablet Take 1 tablet (20 mg) by mouth 2 times daily 180 tablet 3     Allergies   Allergen Reactions     No Known Drug Allergies      Recent Labs   Lab Test 10/11/21  1357 21  0942   LDL  --  113*   HDL  --  53   TRIG  --  66   TSH 1.24  --         Breast Cancer Screening:  Any new diagnosis of family breast, ovarian, or bowel cancer? No    FHS-7:   Breast CA Risk Assessment (FHS-7) 10/18/2021   Did any of your first-degree relatives have breast or ovarian cancer? No   Did any of your relatives have bilateral breast cancer? No   Did any man in your family have breast cancer? No   Did any woman in your family have breast and ovarian cancer? No   Did any woman in your  "family have breast cancer before age 50 y? No   Do you have 2 or more relatives with breast and/or ovarian cancer? No   Do you have 2 or more relatives with breast and/or bowel cancer? No     Mammogram Screening - Offered annual screening and updated Health Maintenance for mutual plan based on risk factor consideration    Pertinent mammograms are reviewed under the imaging tab.    History of abnormal Pap smear: YES - updated in Problem List and Health Maintenance accordingly  PAP / HPV Latest Ref Rng & Units 12/21/2020 8/23/2019   PAP (Historical) - NIL NIL   HPV16 NEG:Negative Negative Negative   HPV18 NEG:Negative Negative Negative   HRHPV NEG:Negative Negative Positive(A)       Review of Systems  CONSTITUTIONAL: NEGATIVE for fever, chills, change in weight  INTEGUMENTARU/SKIN: NEGATIVE for worrisome rashes, moles or lesions  EYES: NEGATIVE for vision changes or irritation  ENT: NEGATIVE for ear, mouth and throat problems  RESP: NEGATIVE for significant cough or SOB  BREAST: NEGATIVE for masses, tenderness or discharge  CV: NEGATIVE for chest pain, palpitations or peripheral edema  GI: NEGATIVE for nausea, abdominal pain, heartburn, or change in bowel habits  : NEGATIVE for unusual urinary or vaginal symptoms. Periods are irregular.  MUSCULOSKELETAL: NEGATIVE for significant arthralgias or myalgia  NEURO: NEGATIVE for weakness, dizziness or paresthesias  PSYCHIATRIC: NEGATIVE for changes in mood or affect     OBJECTIVE:   /73 (BP Location: Right arm, Cuff Size: Adult Regular)   Pulse 98   Ht 1.619 m (5' 3.75\")   Wt 62.5 kg (137 lb 11.2 oz)   LMP 01/12/2022 (Approximate)   SpO2 99%   Breastfeeding No   BMI 23.82 kg/m    Physical Exam  Gen: Alert and oriented times 3, no acute distress.  Well developed, well nourished, pleasant.    Neck: Supple, no masses.  No thyromegaly.  Breast: Symmetrical without lesions.  No dimpling, nipple discharge, or discrete masses.  No lymphadenopathy.  Chest:  Non " "labored.  Clear to auscultation bilaterally.    Heart: Regular, normal S1, S2.  No murmurs.   Abdomen: Soft, nontender, nondistended.  No hepatosplenomegaly.    :  Normal female external genitalia.  No lesions.  Urethral meatus normal.  Speculum exam reveals a normal vaginal vault, normal cervix .  No abnormal discharge.  Bimanual exam reveals a normal, mobile, nontender uterus .  No cervical motion tenderness.  Adnexa nontender with no palpable masses.    Extremities:  Nontender, no edema.      ASSESSMENT/PLAN:       ICD-10-CM    1. Well female exam with routine gynecological exam  Z01.419          COUNSELING:  Reviewed preventive health counseling, as reflected in patient instructions    Estimated body mass index is 23.82 kg/m  as calculated from the following:    Height as of this encounter: 1.619 m (5' 3.75\").    Weight as of this encounter: 62.5 kg (137 lb 11.2 oz).        She reports that she has never smoked. She has never used smokeless tobacco.      Counseling Resources:  ATP IV Guidelines  Pooled Cohorts Equation Calculator  Breast Cancer Risk Calculator  BRCA-Related Cancer Risk Assessment: FHS-7 Tool  FRAX Risk Assessment  ICSI Preventive Guidelines  Dietary Guidelines for Americans, 2010  USDA's MyPlate  ASA Prophylaxis  Lung CA Screening    Katt Rush MD  [facility] [place]  "

## 2022-01-31 NOTE — PROGRESS NOTES
Zulma Coronado is a 41 year old  Women who presents today requesting placement of an Mirena iud.  She recently had an evaluation for abnormal uterine bleeding, which was normal.  Ultrasound was done 10/20/2021 and normal.  Component      Latest Ref Rng & Units 10/11/2021   TSH      0.40 - 4.00 mU/L 1.24   Hemoglobin      11.7 - 15.7 g/dL 13.9   Prolactin      3 - 27 ug/L 14       She denies the possibility of pregnancy.  Her partner has a vasectomy.      We discussed risks, benefits, and alternatives including but not limited to:     Possibility of pregnancy and ectopic pregnancy.    Possibility of pelvic inflammatory disease, particularly in the first 20 days and with new partners.    Risk of uterine perforation or IUD expulsion.    Possibility of difficult removal.    Spotting or heavy bleeding.    Cramping, pain or infection during or after insertion.    She understands the main indication for removal is abnormal bleeding.  The patient has given consent to proceed with placement of the IUD.  She wishes to proceed.  All questions answered.    PROCEDURE:    Type of IUD: Mirena   She is placed in a dorsal lithotomy potion and a pelvic exam is performed to determine the position of the uterus.  The cervix is identified and cleaned with betadine.  An allis tenaculum is applied to the anterior lip of the cervix for stabilization.   The uterus sounded to 8.0 cm. (Target sound depth is 6.5 cm to 8.5 cm.)  The IUD is inserted into the uterus under sterile conditions in the usual fashion.  The IUD string is then cut to 3.5 cm.    The patient tolerated this procedure without immediate complication.  The patient is to return or call immediately for any unexplained fever, abdominal or pelvic pain, excessive bleeding, possibility of pregnancy, foul-smelling discharge, sense that the IUD has been expelled.  All questions were answered.  Patient is aware that the IUD will be effective for 5-7 years and then will need removal or  replacement.      Katt Rush MD

## 2022-03-29 ENCOUNTER — VIRTUAL VISIT (OUTPATIENT)
Dept: PSYCHIATRY | Facility: CLINIC | Age: 42
End: 2022-03-29
Payer: COMMERCIAL

## 2022-03-29 DIAGNOSIS — F41.1 GENERALIZED ANXIETY DISORDER: ICD-10-CM

## 2022-03-29 PROCEDURE — 99204 OFFICE O/P NEW MOD 45 MIN: CPT | Mod: GT | Performed by: STUDENT IN AN ORGANIZED HEALTH CARE EDUCATION/TRAINING PROGRAM

## 2022-03-29 RX ORDER — HYDROXYZINE HYDROCHLORIDE 25 MG/1
25 TABLET, FILM COATED ORAL EVERY 8 HOURS PRN
Qty: 30 TABLET | Refills: 1 | Status: SHIPPED | OUTPATIENT
Start: 2022-03-29 | End: 2023-07-24

## 2022-03-29 RX ORDER — BUSPIRONE HYDROCHLORIDE 5 MG/1
TABLET ORAL
Qty: 120 TABLET | Refills: 1 | Status: SHIPPED | OUTPATIENT
Start: 2022-03-29 | End: 2022-07-29

## 2022-03-29 ASSESSMENT — PATIENT HEALTH QUESTIONNAIRE - PHQ9
10. IF YOU CHECKED OFF ANY PROBLEMS, HOW DIFFICULT HAVE THESE PROBLEMS MADE IT FOR YOU TO DO YOUR WORK, TAKE CARE OF THINGS AT HOME, OR GET ALONG WITH OTHER PEOPLE: SOMEWHAT DIFFICULT
SUM OF ALL RESPONSES TO PHQ QUESTIONS 1-9: 3
SUM OF ALL RESPONSES TO PHQ QUESTIONS 1-9: 3

## 2022-03-29 ASSESSMENT — ANXIETY QUESTIONNAIRES
1. FEELING NERVOUS, ANXIOUS, OR ON EDGE: MORE THAN HALF THE DAYS
GAD7 TOTAL SCORE: 11
5. BEING SO RESTLESS THAT IT IS HARD TO SIT STILL: SEVERAL DAYS
GAD7 TOTAL SCORE: 11
7. FEELING AFRAID AS IF SOMETHING AWFUL MIGHT HAPPEN: SEVERAL DAYS
2. NOT BEING ABLE TO STOP OR CONTROL WORRYING: MORE THAN HALF THE DAYS
GAD7 TOTAL SCORE: 11
3. WORRYING TOO MUCH ABOUT DIFFERENT THINGS: MORE THAN HALF THE DAYS
4. TROUBLE RELAXING: MORE THAN HALF THE DAYS
7. FEELING AFRAID AS IF SOMETHING AWFUL MIGHT HAPPEN: SEVERAL DAYS
6. BECOMING EASILY ANNOYED OR IRRITABLE: SEVERAL DAYS

## 2022-03-29 NOTE — PATIENT INSTRUCTIONS
Zulma-It was nice to meet you today. Here is what we discussed:    -Start buspirone: 5 mg twice daily for 2 weeks, then increase to 10 mg twice daily.    -OK to use hydroxyzine 25 mg every 8 hours as needed for anxiety    -Please ask your therapist about CBT techniques for anxiety.    -Please call [phone] to schedule with me in about 1 month.      Beth Clark MD  [facility]        **For crisis resources, please see the information at the end of this document**   Patient Education    Thank you for coming to the [facility].    Lab Testing:  If you had lab testing today and your results are reassuring or normal they will be mailed to you or sent through [facility] within 7 days. If the lab tests need quick action we will call you with the results. The phone number we will call with results is # [phone]. If this is not the best number please call our clinic and change the number.     Medication Refills:  If you need any refills please call your pharmacy and they will contact us. Our fax number for refills is [phone]. Please allow three business days for refill processing.   If you need to change to a different pharmacy, please contact the new pharmacy directly. The new pharmacy will help you get your medications transferred.     Contact Us:  Please call [phone] during business hours (8-5:00 M-F).  If you have medication related questions after clinic hours, or on the weekend, please call [phone].    Financial Assistance [phone]  Medical Records [phone]       MENTAL HEALTH CRISIS RESOURCES:  For a emergency help, please call 911 or go to the nearest Emergency Department.     Emergency Walk-In Options:   EmPATH Unit @ [facility] ([place]): [phone] - Specialized mental health emergency area designed to be [facility]  [facility] ([place]): [phone]  [facility] ([place]):  [phone]  [facility] ([place]): [phone]    [facility] Crisis Information:   [place]: [phone]  [place]: [phone]  [place] [facility]) - Adult: [phone]     Child: [phone]  [place] - Adult: [phone]     Child: [phone]  [place]: [phone]  List of all [facility] resources:   https://[place].gov/dhs/people-we-serve/adults/health-care/mental-health/resources/crisis-contacts.jsp    National Crisis Information:   Crisis Text Line: Text  [place]  to 953453  National Suicide Prevention Lifeline: 7-802-438-[facility] (1-582.426.8321)       For online chat options, visit https://suicidepreventionlifeline.org/chat/  Poison Control Center: 1-907.154.9096  Trans Lifeline: 3-373-156-6253 - Hotline for transgender people of all ages  The [employer]: 3-289-480-3408 - Hotline for LGBT youth     For Non-Emergency Support:   Fast Tracker: Mental Health & Substance Use Disorder Resources -   https://www.[place].org/
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
## 2022-03-30 ASSESSMENT — ANXIETY QUESTIONNAIRES: GAD7 TOTAL SCORE: 11

## 2022-03-30 ASSESSMENT — PATIENT HEALTH QUESTIONNAIRE - PHQ9: SUM OF ALL RESPONSES TO PHQ QUESTIONS 1-9: 3

## 2022-04-25 NOTE — TELEPHONE ENCOUNTER
"Refill request r'cd from [facility] via fax for Buspirone 5 mg denied due to fills on file. Faxed \"not authorized\" back to pharmacy.    Natasha Sanders RN April 25, 2022 3:05 PM    "

## 2022-06-10 ENCOUNTER — OFFICE VISIT (OUTPATIENT)
Dept: FAMILY MEDICINE | Facility: CLINIC | Age: 42
End: 2022-06-10
Payer: COMMERCIAL

## 2022-06-10 VITALS
OXYGEN SATURATION: 100 % | RESPIRATION RATE: 18 BRPM | WEIGHT: 136 LBS | TEMPERATURE: 98.3 F | DIASTOLIC BLOOD PRESSURE: 80 MMHG | HEIGHT: 64 IN | BODY MASS INDEX: 23.22 KG/M2 | HEART RATE: 92 BPM | SYSTOLIC BLOOD PRESSURE: 122 MMHG

## 2022-06-10 DIAGNOSIS — M79.10 MUSCLE TENSION PAIN: ICD-10-CM

## 2022-06-10 DIAGNOSIS — Z83.79 FAMILY HISTORY OF ULCERATIVE COLITIS: ICD-10-CM

## 2022-06-10 DIAGNOSIS — F41.1 GENERALIZED ANXIETY DISORDER: ICD-10-CM

## 2022-06-10 DIAGNOSIS — L65.9 HAIR LOSS: ICD-10-CM

## 2022-06-10 DIAGNOSIS — R20.2 PARESTHESIA: Primary | ICD-10-CM

## 2022-06-10 LAB
ANION GAP SERPL CALCULATED.3IONS-SCNC: 5 MMOL/L (ref 3–14)
BUN SERPL-MCNC: 17 MG/DL (ref 7–30)
CALCIUM SERPL-MCNC: 9.2 MG/DL (ref 8.5–10.1)
CHLORIDE BLD-SCNC: 106 MMOL/L (ref 94–109)
CO2 SERPL-SCNC: 29 MMOL/L (ref 20–32)
CREAT SERPL-MCNC: 0.85 MG/DL (ref 0.52–1.04)
CRP SERPL-MCNC: <2.9 MG/L (ref 0–8)
ERYTHROCYTE [DISTWIDTH] IN BLOOD BY AUTOMATED COUNT: 11.8 % (ref 10–15)
ERYTHROCYTE [SEDIMENTATION RATE] IN BLOOD BY WESTERGREN METHOD: 14 MM/HR (ref 0–20)
FERRITIN SERPL-MCNC: 22 NG/ML (ref 12–150)
GFR SERPL CREATININE-BSD FRML MDRD: 86 ML/MIN/1.73M2
GLUCOSE BLD-MCNC: 93 MG/DL (ref 70–99)
HBA1C MFR BLD: 5.1 % (ref 0–5.6)
HCT VFR BLD AUTO: 42.9 % (ref 35–47)
HGB BLD-MCNC: 14.1 G/DL (ref 11.7–15.7)
MCH RBC QN AUTO: 29.8 PG (ref 26.5–33)
MCHC RBC AUTO-ENTMCNC: 32.9 G/DL (ref 31.5–36.5)
MCV RBC AUTO: 91 FL (ref 78–100)
PLATELET # BLD AUTO: 360 10E3/UL (ref 150–450)
POTASSIUM BLD-SCNC: 4.5 MMOL/L (ref 3.4–5.3)
RBC # BLD AUTO: 4.73 10E6/UL (ref 3.8–5.2)
SODIUM SERPL-SCNC: 140 MMOL/L (ref 133–144)
TSH SERPL DL<=0.005 MIU/L-ACNC: 2.29 MU/L (ref 0.4–4)
VIT B12 SERPL-MCNC: 740 PG/ML (ref 193–986)
WBC # BLD AUTO: 10.6 10E3/UL (ref 4–11)

## 2022-06-10 PROCEDURE — 80048 BASIC METABOLIC PNL TOTAL CA: CPT | Performed by: FAMILY MEDICINE

## 2022-06-10 PROCEDURE — 82607 VITAMIN B-12: CPT | Performed by: FAMILY MEDICINE

## 2022-06-10 PROCEDURE — 86038 ANTINUCLEAR ANTIBODIES: CPT | Performed by: FAMILY MEDICINE

## 2022-06-10 PROCEDURE — 99214 OFFICE O/P EST MOD 30 MIN: CPT | Performed by: FAMILY MEDICINE

## 2022-06-10 PROCEDURE — 85027 COMPLETE CBC AUTOMATED: CPT | Performed by: FAMILY MEDICINE

## 2022-06-10 PROCEDURE — 86140 C-REACTIVE PROTEIN: CPT | Performed by: FAMILY MEDICINE

## 2022-06-10 PROCEDURE — 83036 HEMOGLOBIN GLYCOSYLATED A1C: CPT | Performed by: FAMILY MEDICINE

## 2022-06-10 PROCEDURE — 85652 RBC SED RATE AUTOMATED: CPT | Performed by: FAMILY MEDICINE

## 2022-06-10 PROCEDURE — 84443 ASSAY THYROID STIM HORMONE: CPT | Performed by: FAMILY MEDICINE

## 2022-06-10 PROCEDURE — 36415 COLL VENOUS BLD VENIPUNCTURE: CPT | Performed by: FAMILY MEDICINE

## 2022-06-10 PROCEDURE — 82728 ASSAY OF FERRITIN: CPT | Performed by: FAMILY MEDICINE

## 2022-06-10 PROCEDURE — 82306 VITAMIN D 25 HYDROXY: CPT | Performed by: FAMILY MEDICINE

## 2022-06-10 PROCEDURE — 83921 ORGANIC ACID SINGLE QUANT: CPT | Performed by: FAMILY MEDICINE

## 2022-06-10 ASSESSMENT — PAIN SCALES - GENERAL: PAINLEVEL: NO PAIN (0)
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
## 2022-06-13 LAB
ANA SER QL IF: NEGATIVE
DEPRECATED CALCIDIOL+CALCIFEROL SERPL-MC: 33 UG/L (ref 20–75)

## 2022-06-13 NOTE — RESULT ENCOUNTER NOTE
Hemal Maya,  All the lab test are normal and negative, as expected. this is reassuring.  I would recommend to consider taking the medications for anxiety as we discussed   Let me know if you have any questions. Take care.  Emily Guo MD

## 2022-06-14 LAB — METHYLMALONATE SERPL-SCNC: 0.23 UMOL/L (ref 0–0.4)

## 2022-06-21 ENCOUNTER — MYC MEDICAL ADVICE (OUTPATIENT)
Dept: OBGYN | Facility: CLINIC | Age: 42
End: 2022-06-21
Payer: COMMERCIAL

## 2022-06-21 DIAGNOSIS — Z97.5 BREAKTHROUGH BLEEDING WITH IUD: Primary | ICD-10-CM

## 2022-06-21 DIAGNOSIS — N92.1 BREAKTHROUGH BLEEDING WITH IUD: Primary | ICD-10-CM

## 2022-06-21 NOTE — TELEPHONE ENCOUNTER
Lets start with an ultrasound to make sure the IUD is in the correct position.  I have placed the order and the patient can call to schedule.

## 2022-06-21 NOTE — TELEPHONE ENCOUNTER
Pt last seen 1/31/2022 for Mirena IUD placement, hx of AUB. US 10/2021 was normal.    Pt having concerns for ongoing spotting since placement, had 1 month where it let up, otherwise needing to wear a panty liner most days.    Pt is able to feel strings, has sharp pelvic pain at times but does not last. Pt denies any other vaginal symptoms.    RN routing to provider for advisement if pt needs f/u in clinic or other recommendation.    Erica Cantor RN on 6/21/2022 at 11:16 AM

## 2022-06-23 ENCOUNTER — ANCILLARY PROCEDURE (OUTPATIENT)
Dept: ULTRASOUND IMAGING | Facility: CLINIC | Age: 42
End: 2022-06-23
Attending: OBSTETRICS & GYNECOLOGY
Payer: COMMERCIAL

## 2022-06-23 DIAGNOSIS — Z97.5 BREAKTHROUGH BLEEDING WITH IUD: ICD-10-CM

## 2022-06-23 DIAGNOSIS — N92.1 BREAKTHROUGH BLEEDING WITH IUD: ICD-10-CM

## 2022-06-23 PROCEDURE — 76830 TRANSVAGINAL US NON-OB: CPT

## 2022-06-23 PROCEDURE — 76856 US EXAM PELVIC COMPLETE: CPT

## 2022-06-23 NOTE — TELEPHONE ENCOUNTER
Pt asking for next steps based on US done today, RN routing to provider for advisement.    Erica Cantor RN on 6/23/2022 at 2:30 PM

## 2022-07-29 ENCOUNTER — OFFICE VISIT (OUTPATIENT)
Dept: OBGYN | Facility: CLINIC | Age: 42
End: 2022-07-29
Payer: COMMERCIAL

## 2022-07-29 VITALS
HEART RATE: 80 BPM | WEIGHT: 139.4 LBS | DIASTOLIC BLOOD PRESSURE: 88 MMHG | BODY MASS INDEX: 24.12 KG/M2 | SYSTOLIC BLOOD PRESSURE: 128 MMHG

## 2022-07-29 DIAGNOSIS — Z97.5 BREAKTHROUGH BLEEDING WITH IUD: Primary | ICD-10-CM

## 2022-07-29 DIAGNOSIS — Z30.432 ENCOUNTER FOR IUD REMOVAL: ICD-10-CM

## 2022-07-29 DIAGNOSIS — N92.1 BREAKTHROUGH BLEEDING WITH IUD: Primary | ICD-10-CM

## 2022-07-29 PROCEDURE — 58301 REMOVE INTRAUTERINE DEVICE: CPT | Performed by: OBSTETRICS & GYNECOLOGY

## 2022-07-29 PROCEDURE — 99213 OFFICE O/P EST LOW 20 MIN: CPT | Mod: 25 | Performed by: OBSTETRICS & GYNECOLOGY

## 2022-07-29 NOTE — PATIENT INSTRUCTIONS
If you have labs or imaging done, the results will automatically release in [employer] without an interpretation.  Your health care professional will review those results and send an interpretation with recommendations as soon as possible, but this may be 1-3 business days.    If you have any questions regarding your visit, please contact your care team.     [facility]: [phone]  [facility] CLINIC HOURS TELEPHONE NUMBER       Katt Rush MD  Assistant Medical Director    [facility] - Certified Medical Assistant     Ulices Perrin-SULLY [place]-  [place]-     Monday- [place]  8:00 a.m - 5:00 p.m    Tuesday- Surgery        Thursday- [place]  8:00 a.m - 5:00 p.m.    Friday- [place]  7:30 a.m - 4:00 p.m. [facility]  [address]  [place] [address]  [phone] Fax  Imaging Scheduling [phone]    [facility]  [address] [place] [address] Dr.  [place], [place] [address] [phone]    [facility]  [address] [place], [place] [address] [phone] Fax  Imaging Scheduling [phone]     Urgent Care locations:  [facility] Monday-Friday  10 am - 8 pm  Saturday and Sunday   9 am - 5 pm  Monday-Friday   10 am - 8 pm  Saturday and Sunday   9 am - 5 pm   [phone]     **Surgeries** Our Surgery Schedulers will contact you to schedule. If you do not receive a call within 3 business days, please call [phone].    If you need a medication refill, please contact your pharmacy. Please allow 3 business days for your refill to be completed.    As always, thank you for trusting us with your healthcare needs!

## 2022-07-29 NOTE — PROGRESS NOTES
"OB/GYN      NAME:  Zulma Coronado  PCP:  No Ref-Primary, Physician  MRN:  [id number]    Impression / Plan     42 year old  with:      ICD-10-CM    1. Breakthrough bleeding with IUD  N92.1     Z97.5    2. Encounter for IUD removal  Z30.432 REMOVE INTRAUTERINE DEVICE       Breakthrough bleeding with the IUD is a common complaint.  Ultrasound imaging reviewed personally and with the patient.  IUD is appropriately placed.  Discussed management options. The patient may try 1-3 months of COCs or estrogen.  Doxycycline for 7-14 days has also been effective (also in combination with Flagyl).  She may also opt for IUD removal.  Patient elects this option.  IUD was removed today, see below.  She will monitor her cycles and will reach out if she needs contraception or management of her menstrual cycles.    Chief Complaint     Chief Complaint   Patient presents with     Follow Up       HPI     Zulma Coronado is a  42 year old female who is seen for breakthrough bleeding with the IUD.    Patient recently had an ultrasound, which demonstrated the IUD in the expected position.    The Mirena IUD was placed by me in January of this year for abnormal uterine bleeding.    Patient's last menstrual period was 2022 (approximate).     Patient has been spotting most days of the month, requiring a panty liner.    No pain or cramping.  No abnormal discharge.  No other concerns today.    Not currently sexually active.    Problem List     Patient Active Problem List    Diagnosis Date Noted     Family history of ulcerative colitis 06/10/2022     Priority: Medium     ASCUS with positive high risk HPV cervical 2019     Priority: Medium     2018 ASCUS, + hr HPV  at [facility]  2018 [place]: Negative  At [facility]  Plan: Cotesting 19 NIL pap, + HR HPV (not 16 or 18). Plan: [place]  19 [place]onna ECC \"cervicitis and epithelial atypia\". Plan: cotest due 20 NIL Pap, Neg HR HPV. Plan: Cotest in 3 " years.         Gastroesophageal reflux disease without esophagitis 01/19/2019     Priority: Medium       Medications     Current Outpatient Medications   Medication     famotidine (PEPCID) 20 MG tablet     levonorgestrel (MIRENA) 20 MCG/24HR IUD     hydrOXYzine (ATARAX) 25 MG tablet     No current facility-administered medications for this visit.        Allergies     Allergies   Allergen Reactions     No Known Drug Allergies        ROS     Pertinent positives and negatives are listed in the HPI.     Physical Exam   Vitals: /88 (BP Location: Right arm, Cuff Size: Adult Regular)   Pulse 80   Wt 63.2 kg (139 lb 6.4 oz)   LMP 07/12/2022 (Approximate)   BMI 24.12 kg/m      General: Comfortable, no obvious distress   : Normal female external genitalia.  No lesions.  Urethral meatus normal.  Speculum exam reveals a normal vaginal vault, normal cervix.  No abnormal discharge.  Normal IUD strings        Labs/Imaging       I have personally reviewed the labs/imaging and the findings were:    Ultrasound 6/23/2022:    Uterus 8.1 x 4.4 x 6.1 cm    Endometrial stripe 4 mm    IUD is present in the expected position    Normal right ovary, left ovary not seen.      IUD REMOVAL    After verifying consent, the cervix was visualized using a speculum.  The IUD strings are visible.   The IUD was removed without difficulty and was verified to be intact.  The patient tolerated the procedure well.    15 min spent on the date of the encounter in chart review, patient visit, review of tests, documentation about the issues documented above, exclusive of the procedure.     Katt Rush MD

## 2022-10-03 ENCOUNTER — OFFICE VISIT (OUTPATIENT)
Dept: FAMILY MEDICINE | Facility: CLINIC | Age: 42
End: 2022-10-03
Payer: COMMERCIAL

## 2022-10-03 VITALS
DIASTOLIC BLOOD PRESSURE: 82 MMHG | HEIGHT: 64 IN | RESPIRATION RATE: 18 BRPM | TEMPERATURE: 98.3 F | BODY MASS INDEX: 24.33 KG/M2 | OXYGEN SATURATION: 100 % | WEIGHT: 142.5 LBS | HEART RATE: 86 BPM | SYSTOLIC BLOOD PRESSURE: 131 MMHG

## 2022-10-03 DIAGNOSIS — K13.0 CYST OF LIP: Primary | ICD-10-CM

## 2022-10-03 PROCEDURE — 99213 OFFICE O/P EST LOW 20 MIN: CPT | Performed by: FAMILY MEDICINE

## 2022-10-03 RX ORDER — CEPHALEXIN 500 MG/1
CAPSULE ORAL
COMMUNITY
Start: 2022-09-25 | End: 2022-10-27

## 2022-10-03 ASSESSMENT — PAIN SCALES - GENERAL: PAINLEVEL: NO PAIN (0)

## 2022-10-03 NOTE — PATIENT INSTRUCTIONS
I would recommend you complete the antibiotic course.    IF increase in swelling, tenderness, redness, drainage or other symptoms occur, send a MyChart message and we will resume antibiotics.    Referral ENT given.

## 2022-10-03 NOTE — PROGRESS NOTES
"  Assessment & Plan     Cyst of lip  This does not appear to be infected currently.  I have recommended she complete her current antibiotics.  Follow up if recurrent swelling or pain occurs.  Referral to ENT if the area continues to give symptoms after completion of treatment.    - Adult ENT  Referral; Future             Patient Instructions   I would recommend you complete the antibiotic course.    IF increase in swelling, tenderness, redness, drainage or other symptoms occur, send a MyChart message and we will resume antibiotics.    Referral ENT given.         Return in about 3 months (around 1/3/2023) for Physical Exam.    Dilia Herrera MD  [facility] FARHANA Maya is a 42 year old presenting for the following health issues:  possible cellulitis       HPI     ED/UC Followup:    Facility:  [facility]  Date of visit: 09/25/22  Reason for visit: Cellulitis of Lip  Current Status: Swelling has gone down and has 1 more day of abx but bump is still present    Thursday felt like getting a pimple - by Sunday - swelling and pain.  Seen in UC and given treatment with Keflex.   Within a 2-3 days of starting the antibiotics there was significant improvement.  No pain currently.  No drainage.  Swelling has resolved.  Mild tenderness of site with direct pressure.    No treatment or history for cold sores in past.      Review of Systems   Constitutional, HEENT, cardiovascular, pulmonary, gi and gu systems are negative, except as otherwise noted.      Objective    /82 (BP Location: Right arm, Patient Position: Sitting, Cuff Size: Adult Regular)   Pulse 86   Temp 98.3  F (36.8  C) (Oral)   Resp 18   Ht 1.619 m (5' 3.75\")   Wt 64.6 kg (142 lb 8 oz)   LMP 09/26/2022   SpO2 100%   BMI 24.65 kg/m    Body mass index is 24.65 kg/m .  Physical Exam   GENERAL: healthy, alert and no distress  HENT: normal cephalic/atraumatic, ear canals and TM's normal, " nose and mouth without ulcers or lesions, oropharynx clear, oral mucous membranes moist and left side lower lip without swelling, erythema or induration.  Palpation of the lower lip shows cystic like area in the lateral lip.  Normal buccal appearance.  NECK: no adenopathy, no asymmetry, masses, or scars and thyroid normal to palpation  RESP: lungs clear to auscultation - no rales, rhonchi or wheezes  CV: regular rate and rhythm, normal S1 S2, no S3 or S4, no murmur, click or rub, no peripheral edema and peripheral pulses strong  MS: no gross musculoskeletal defects noted, no edema

## 2022-10-13 ENCOUNTER — TELEPHONE (OUTPATIENT)
Dept: OTOLARYNGOLOGY | Facility: CLINIC | Age: 42
End: 2022-10-13

## 2022-10-13 NOTE — TELEPHONE ENCOUNTER
[facility] Call Center    Phone Message    May a detailed message be left on voicemail: yes     Reason for Call: Other: Pt. Received a call to schedule with Dr. Arriaga in  for Cyst of lip [K13.0] (ref prov: nikki Herrera).  I was unable to schedule the appt with that provider, can you please give her a call to schedule?  Thank you!    Action Taken: Message routed to: Other:   ENT    Travel Screening: Not Applicable

## 2022-10-19 ENCOUNTER — TRANSFERRED RECORDS (OUTPATIENT)
Dept: HEALTH INFORMATION MANAGEMENT | Facility: CLINIC | Age: 42
End: 2022-10-19

## 2022-10-20 NOTE — PROGRESS NOTES
Facial Plastic and Reconstructive Surgery Consultation  Department of Otolaryngology  [facility]    Zulma Coronado  : 1980   MRN: [id number]      Dear Doctors Sharon and Grover,    Thank you for asking me to see your patient, Ms. Zulma Coronado, in consultation to evaluate her skin growth.  Today I had the pleasure of seeing her at the Facial Plastic and Reconstructive Surgery Clinic in the Department of Otolaryngology at [facility].      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 I recommend excision of this lesion for both diagnostic and treatment purposes because of clinical uncertainty of the exact diagnosis, recent growth and history of severe infection.    2.) Anticipated left lower lip mucosal and cutaneous wound after excision   MDM: (undiagnosed problem with uncertain prognosis). The reconstructive options of healing by secondary intention, primary closure, and local flap reconstruction were described. The patient understands the the final reconstructive plan cannot be finalized until the size, depth and contents of the wound are known and has given us permission to finalize the plan during the procedure. We discussed the options of horizontal (along the vermilion) or vertical (radial to the mouth) and she preferred the vertical because the horizontal will be difficult to locate perfectly along the vermilion and any distortion will be noticeable. She understands this comes with a scar and this was demonstrated in the mirror and on paper. The patient agrees with this plan and will initiate procedure scheduling when ready. I look forward to seeing Ms. Coronado on her procedure day.  Care Checklist:  _Schedule for excision and stage 1 complex closure (she chose a vertical or radial closure), possible local flaps.  _We discussed the goal of  leaving slight excess tissue in the reconstruction to avoid a cleft or groove postoperatively due to the reconstruction. She may require a second stage surgery to address any deformity caused by residual tissue fullness or scar widening.     Background/Connection:   Preferred name = Zulma  What is most important to know about you as a person? (No medical information) College daughter at [employer] and middle school child. Works for [employer].     Photographs: [facility] consents signed 10/27/22    It has been a pleasure to participate in the care of Ms. Coronado. Thank you for this kind referral.     Sincerely,    Hernandez Arriaga MD  Facial Plastic and Reconstructive Surgeon  Department of Otolaryngology  [facility]            ___________________________________________________    MA/RN Section    SKIN QUESTIONNAIRE:     Background/Connection:   Preferred name =  Zulma  What is most important to know about you as a person? (No medical information) 2 children, 1 in college    How many different growths do you want Dr. Arriaga to look at today? 1    Where is the most concerning growth: left lip    When did you first notice this growth? 2 months ago.     No. Has this growth been biopsied?   If yes, what did the biopsy show? N/A    0/10. How would you rate your pain at that site?  YES. Color change?  YES. Growth?  No. Numbness?  No. Ulceration or breakdown of the skin?  No. Itching?  YES. Purulence or leaking of infected fluid?  No. Oozing?  No. Swelling?  YES. Redness of the skin?  No. Recurrent physical trauma (cut shaving, rub on necklace or bra)?    No. Have you ever had a skin cancer?  No. Has anyone in your family ever had a skin cancer?      SURGICAL RISK FACTORS  Do you currently have or have you ever had in the past:    No. Do you currently smoke?  No. Problems with sedation, anesthesia, or surgery.  No. Use blood thinners. If yes, what blood thinner: N/A   No.  Any heart problems.   No. Chest pain.    No. A pacemaker.  No. Problems with excessive bleeding or a bleeding disorder.  No. Problems with blood clots or a clotting disorder.   No. Sleep apnea or sleep with a CPAP machine.  No. Hepatitis.    No. HIV or AIDS.  No. Family history of excessive bleeding or a bleeding disorder?    No. Family history of blood clots or a clotting disorder?      Signature:Alicia Mitchell RN          ________________________________________________  Dr. Arriaga Section    MA/RN transcription of patient information: I reviewed the information the MA/RN transcribed from the patient that is documented above.  Signature (type .me): Hernandez Arriaga MD       HISTORY OF PRESENT ILLNESS and QUESTIONNAIRE:  As you know, Ms. Coronado is an 42 year old-year-old female who presents with a growth of the skin. About 2 months ago, her entire left lower lip swelled up. It started with what looked like acne at the left lower lip line, then the entire left lower lip swelled within a couple days. She sought care at an urgent care where she was given antibiotics. The swelling resolved after the antibioitics but she was left with the small red swelling that was originally thought of lip line acne. Since that time, the lesion had a white head. She was referred for further evaluation and treatment.       Where is the most concerning growth located? left lower lip near the commissure.    No. Has this growth been biopsied?   If yes, enter the biopsy results into the clinical summary.     No.  Bleeding?  No.  Pain?  YES. Growth? Feels like it has more fluid in it per patient.   YES.  Has it become infected?    Other pertinent history: no other lesions like this before.     PAST MEDICAL HISTORY:  Past Medical History:   Diagnosis Date     Human papillomavirus in conditions classified elsewhere and of unspecified site 11/2000     Meningitis, unspecified(322.9) 1980    Meningitis, viral     None. Medical problems not listed in Epic History or Problem List?      PAST SURGICAL HISTORY:  Past Surgical History:   Procedure Laterality Date      SECTION       COMBINED ESOPHAGOSCOPY, GASTROSCOPY, DUODENOSCOPY (EGD) WITH CO2 INSUFFLATION N/A 2019    Procedure: COMBINED ESOPHAGOSCOPY, GASTROSCOPY, DUODENOSCOPY (EGD) WITH CO2 INSUFFLATION;  Surgeon: Chacorta Nunez MD;  Location: [facility] OR     ESOPHAGOSCOPY, GASTROSCOPY, DUODENOSCOPY (EGD), COMBINED N/A 2019    Procedure: Combined Esophagoscopy, Gastroscopy, Duodenoscopy (Egd), Biopsy Single Or Multiple;  Surgeon: Chacorta Nunez MD;  Location: [facility] OR     None. Past surgeries not listed in Epic History or Problem List?     SOCIAL HISTORY:   Social History     Tobacco Use     Smoking status: Never     Smokeless tobacco: Never   Substance Use Topics     Alcohol use: Yes     Comment: rarely       ALLERGIES:   No known drug allergies    MEDICATIONS:   Current Outpatient Medications   Medication Sig Dispense Refill     cephALEXin (KEFLEX) 500 MG capsule TAKE 1 CAPSULE (500 MG) BY MOUTH 4 TIMES A DAY FOR 10 DAYS       famotidine (PEPCID) 20 MG tablet TAKE 1 TABLET BY MOUTH TWICE A  tablet 0     hydrOXYzine (ATARAX) 25 MG tablet Take 1 tablet (25 mg) by mouth every 8 hours as needed for anxiety 30 tablet 1     levonorgestrel (MIRENA) 20 MCG/24HR IUD 1 each (20 mcg) by Intrauterine route once (Patient not taking: Reported on 10/3/2022)         PHYSICAL EXAMINATION:  SKIN:   Lesion 1.  Location: left lower lip in the mucosal lip.  Proximity to adjacent facial landmarks: abutting the vermilion border and near the commissure.   size: 5x5mm  height: raised  color. Red with white center  borders: smooth  mobility: fixed to overlying mucosa  Numbness around the lesion: absent  Expressible fluid: absent  ulceration: absent  bleeding: absent    Remainder of physical exam:  General: Pleasant affect, normal ability to communicate  Oral cavity/oropharynx: Normal mouth opening. No OC/OP masses.except the  lower lip lesion.  Respiratory: NAD, no stridor, voice strong  Facial nerve: Facial nerve strong and symmetric, HB I/VI bilaterally      _________________________________________      PREPROCEDURE COUNSELING:   An extensive discussion was held regarding the proposed procedure which included the following information:    Information about your procedure: Dr. Arriaga provided you with information pertaining to the procedure he offered you, the risks and limitations associated with the procedure, the benefits of the procedure, and possible alternative treatment choices including not having surgery. Please make sure you have carefully discussed the procedure with Dr. Arriaga, that all your questions have been answered, and that you completely understand the procedure before undergoing the procedure. If you have any questions after the visit, please call our office or return to see Dr. Arriaga in the office.   Alternative treatment: One alternative to the treatment you discussed with Dr. Arriaga is no further treatment.  Make sure you are satisfied with your understanding of the possible outcomes, consequences, and risks if no further treatment is performed.   Risks: All surgery has risks or complications from the procedure. The risks of your procedure include but are not limited to:     Bleeding    Infection    Damage to surrounding structures    Distortion or pulling of surrounding facial landmarks including your vermilion border or lip volume    Functional problems after surgery: lip weakness    Facial weakness    Facial numbness    Chronic pain    Unfavorable change in your appearance    Delayed healing    Partial or total skin loss around the surgery site    The incision pulling apart    Excessive scarring, scar widening, or scar growth    Extruded sutures or sutures spitting out of your skin    Discovery of a growth, cancer, or other unexpected condition requiring treatment    Allergic reaction    Unforeseen complications  related to the procedure or anesthesia  Additional risks factors: none.  Smoking: if you smoke, all smoking should stop before your procedure to decrease the risk of healing problems after surgery. Even when smoking is successfully stopped prior to surgery, the risk of healing problems after your procedure is higher that someone who has never smokes because of the long term damage caused by smoking. If you feel ready to quit and need help, please talk with your primary care provider or let us know so we can connect you with resources to help you quit.    Excision of skin lesions has these additional risks:    Failure to remove all the lesion with the need to do the procedure again to remove more    Regrowth of the lesion    The procedure has several limitations and expected outcomes including:     There will always be a permanent scar at the surgery sites that will never go away    The scar is usually at least 3 times longer than the length of the growth, lesion, or mass on your face or neck     The scar will be red for many months and will hopefully fade over time but there is a risk of persistent redness.     Additional skin may need to be removed during the procedure    Additional surgeries may be needed to obtain an optimal result    No reconstructive effort will be able to restore you perfectly, you will always be able to tell you had surgery at that site     Facial asymmetries will be present after the reconstruction      Want to schedule a procedure? please call our  at [phone] or [phone].  Questions? Please contact us at [phone] if you have questions or if we can be of service.    The patient indicated an understanding of the risks, benefits, alternatives and limitations of the procedure and all questions were answered to their satisfaction.       Signature: Hernandez Arriaga MD

## 2022-10-27 ENCOUNTER — PREP FOR PROCEDURE (OUTPATIENT)
Dept: OTOLARYNGOLOGY | Facility: CLINIC | Age: 42
End: 2022-10-27

## 2022-10-27 ENCOUNTER — OFFICE VISIT (OUTPATIENT)
Dept: OTOLARYNGOLOGY | Facility: CLINIC | Age: 42
End: 2022-10-27
Payer: COMMERCIAL

## 2022-10-27 DIAGNOSIS — L98.9 SKIN LESION: Primary | ICD-10-CM

## 2022-10-27 PROCEDURE — 99203 OFFICE O/P NEW LOW 30 MIN: CPT | Performed by: OTOLARYNGOLOGY

## 2022-10-27 ASSESSMENT — PAIN SCALES - GENERAL: PAINLEVEL: NO PAIN (0)

## 2022-10-27 NOTE — NURSING NOTE
Zulma Coronado's goals for this visit include:   Chief Complaint   Patient presents with     Consult     Cyst on lip       She requests these members of her care team be copied on today's visit information:     PCP: No Ref-Primary, Physician    Referring Provider:  No referring provider defined for this encounter.    LMP 09/26/2022     Do you need any medication refills at today's visit? No    Alicia Mitchell RN

## 2022-10-27 NOTE — Clinical Note
Please send letter in progress note section  to referring physician and PCP with appropriate letterhead.  Dr. Ger Ness [id number],  [place] [address]

## 2022-10-27 NOTE — LETTER
10/27/2022         RE: Zulma Coronado  [address] [facility] [address]        Dear Colleague,    Thank you for referring your patient, Zulma Coronado, to the [facility]. Please see a copy of my visit note below.    Facial Plastic and Reconstructive Surgery Consultation  Department of Otolaryngology  [facility]    Zulma Coronado  : 1980   MRN: [id number]      Dear Doctors Sharon and Grover,    Thank you for asking me to see your patient, Ms. Zulma Coronado, in consultation to evaluate her skin growth.  Today I had the pleasure of seeing her at the Facial Plastic and Reconstructive Surgery Clinic in the Department of Otolaryngology at [facility].      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 I recommend excision of this lesion for both diagnostic and treatment purposes because of clinical uncertainty of the exact diagnosis, recent growth and history of severe infection.    2.) Anticipated left lower lip mucosal and cutaneous wound after excision   MDM: (undiagnosed problem with uncertain prognosis). The reconstructive options of healing by secondary intention, primary closure, and local flap reconstruction were described. The patient understands the the final reconstructive plan cannot be finalized until the size, depth and contents of the wound are known and has given us permission to finalize the plan during the procedure. We discussed the options of horizontal (along the vermilion) or vertical (radial to the mouth) and she preferred the vertical because the horizontal will be difficult to locate perfectly along the vermilion and any distortion will be noticeable. She understands this comes with a scar and this was demonstrated in the mirror and on paper. The patient agrees with this plan and will  initiate procedure scheduling when ready. I look forward to seeing Ms. Coronado on her procedure day.  Care Checklist:  _Schedule for excision and stage 1 complex closure (she chose a vertical or radial closure), possible local flaps.  _We discussed the goal of leaving slight excess tissue in the reconstruction to avoid a cleft or groove postoperatively due to the reconstruction. She may require a second stage surgery to address any deformity caused by residual tissue fullness or scar widening.     Background/Connection:   Preferred name = Zulma  What is most important to know about you as a person? (No medical information) College daughter at [employer] and [employer] school child. Works for [employer].     Photographs: [facility] consents signed 10/27/22    It has been a pleasure to participate in the care of Ms. Coronado. Thank you for this kind referral.     Sincerely,    Hernandez Arriaga MD  Facial Plastic and Reconstructive Surgeon  Department of Otolaryngology  [facility]            ___________________________________________________    MA/RN Section    SKIN QUESTIONNAIRE:     Background/Connection:   Preferred name =  Zulma  What is most important to know about you as a person? (No medical information) 2 children, 1 in college    How many different growths do you want Dr. Arriaga to look at today? 1    Where is the most concerning growth: left lip    When did you first notice this growth? 2 months ago.     No. Has this growth been biopsied?   If yes, what did the biopsy show? N/A    0/10. How would you rate your pain at that site?  YES. Color change?  YES. Growth?  No. Numbness?  No. Ulceration or breakdown of the skin?  No. Itching?  YES. Purulence or leaking of infected fluid?  No. Oozing?  No. Swelling?  YES. Redness of the skin?  No. Recurrent physical trauma (cut shaving, rub on necklace or bra)?    No. Have you ever had a skin cancer?  No. Has anyone in your family ever had a skin cancer?      SURGICAL  RISK FACTORS  Do you currently have or have you ever had in the past:    No. Do you currently smoke?  No. Problems with sedation, anesthesia, or surgery.  No. Use blood thinners. If yes, what blood thinner: N/A   No.  Any heart problems.   No. Chest pain.   No. A pacemaker.  No. Problems with excessive bleeding or a bleeding disorder.  No. Problems with blood clots or a clotting disorder.   No. Sleep apnea or sleep with a CPAP machine.  No. Hepatitis.    No. HIV or AIDS.  No. Family history of excessive bleeding or a bleeding disorder?    No. Family history of blood clots or a clotting disorder?      Signature:Alicia Mitchell RN          ________________________________________________  Dr. Arriaga Section    MA/RN transcription of patient information: I reviewed the information the MA/RN transcribed from the patient that is documented above.  Signature (type .me): Hernandez Arriaga MD       HISTORY OF PRESENT ILLNESS and QUESTIONNAIRE:  As you know, Ms. Coronado is an 42 year old-year-old female who presents with a growth of the skin. About 2 months ago, her entire left lower lip swelled up. It started with what looked like acne at the left lower lip line, then the entire left lower lip swelled within a couple days. She sought care at an urgent care where she was given antibiotics. The swelling resolved after the antibioitics but she was left with the small red swelling that was originally thought of lip line acne. Since that time, the lesion had a white head. She was referred for further evaluation and treatment.       Where is the most concerning growth located? left lower lip near the commissure.    No. Has this growth been biopsied?   If yes, enter the biopsy results into the clinical summary.     No.  Bleeding?  No.  Pain?  YES. Growth? Feels like it has more fluid in it per patient.   YES.  Has it become infected?    Other pertinent history: no other lesions like this before.     PAST MEDICAL HISTORY:  Past Medical  History:   Diagnosis Date     Human papillomavirus in conditions classified elsewhere and of unspecified site 2000     Meningitis, unspecified(322.9) 1980    Meningitis, viral     None. Medical problems not listed in Epic History or Problem List?     PAST SURGICAL HISTORY:  Past Surgical History:   Procedure Laterality Date      SECTION       COMBINED ESOPHAGOSCOPY, GASTROSCOPY, DUODENOSCOPY (EGD) WITH CO2 INSUFFLATION N/A 2019    Procedure: COMBINED ESOPHAGOSCOPY, GASTROSCOPY, DUODENOSCOPY (EGD) WITH CO2 INSUFFLATION;  Surgeon: Chacorta Nunez MD;  Location: [facility] OR     ESOPHAGOSCOPY, GASTROSCOPY, DUODENOSCOPY (EGD), COMBINED N/A 2019    Procedure: Combined Esophagoscopy, Gastroscopy, Duodenoscopy (Egd), Biopsy Single Or Multiple;  Surgeon: Chacorta Nunez MD;  Location: [facility] OR     None. Past surgeries not listed in Epic History or Problem List?     SOCIAL HISTORY:   Social History     Tobacco Use     Smoking status: Never     Smokeless tobacco: Never   Substance Use Topics     Alcohol use: Yes     Comment: rarely       ALLERGIES:   No known drug allergies    MEDICATIONS:   Current Outpatient Medications   Medication Sig Dispense Refill     cephALEXin (KEFLEX) 500 MG capsule TAKE 1 CAPSULE (500 MG) BY MOUTH 4 TIMES A DAY FOR 10 DAYS       famotidine (PEPCID) 20 MG tablet TAKE 1 TABLET BY MOUTH TWICE A  tablet 0     hydrOXYzine (ATARAX) 25 MG tablet Take 1 tablet (25 mg) by mouth every 8 hours as needed for anxiety 30 tablet 1     levonorgestrel (MIRENA) 20 MCG/24HR IUD 1 each (20 mcg) by Intrauterine route once (Patient not taking: Reported on 10/3/2022)         PHYSICAL EXAMINATION:  SKIN:   Lesion 1.  Location: left lower lip in the mucosal lip.  Proximity to adjacent facial landmarks: abutting the vermilion border and near the commissure.   size: 5x5mm  height: raised  color. Red with white center  borders: smooth  mobility: fixed to overlying mucosa  Numbness  around the lesion: absent  Expressible fluid: absent  ulceration: absent  bleeding: absent    Remainder of physical exam:  General: Pleasant affect, normal ability to communicate  Oral cavity/oropharynx: Normal mouth opening. No OC/OP masses.except the lower lip lesion.  Respiratory: NAD, no stridor, voice strong  Facial nerve: Facial nerve strong and symmetric, HB I/VI bilaterally      _________________________________________      PREPROCEDURE COUNSELING:   An extensive discussion was held regarding the proposed procedure which included the following information:    Information about your procedure: Dr. Arriaga provided you with information pertaining to the procedure he offered you, the risks and limitations associated with the procedure, the benefits of the procedure, and possible alternative treatment choices including not having surgery. Please make sure you have carefully discussed the procedure with Dr. Arriaga, that all your questions have been answered, and that you completely understand the procedure before undergoing the procedure. If you have any questions after the visit, please call our office or return to see Dr. Arriaga in the office.   Alternative treatment: One alternative to the treatment you discussed with Dr. Arriaga is no further treatment.  Make sure you are satisfied with your understanding of the possible outcomes, consequences, and risks if no further treatment is performed.   Risks: All surgery has risks or complications from the procedure. The risks of your procedure include but are not limited to:     Bleeding    Infection    Damage to surrounding structures    Distortion or pulling of surrounding facial landmarks including your vermilion border or lip volume    Functional problems after surgery: lip weakness    Facial weakness    Facial numbness    Chronic pain    Unfavorable change in your appearance    Delayed healing    Partial or total skin loss around the surgery site    The incision  pulling apart    Excessive scarring, scar widening, or scar growth    Extruded sutures or sutures spitting out of your skin    Discovery of a growth, cancer, or other unexpected condition requiring treatment    Allergic reaction    Unforeseen complications related to the procedure or anesthesia  Additional risks factors: none.  Smoking: if you smoke, all smoking should stop before your procedure to decrease the risk of healing problems after surgery. Even when smoking is successfully stopped prior to surgery, the risk of healing problems after your procedure is higher that someone who has never smokes because of the long term damage caused by smoking. If you feel ready to quit and need help, please talk with your primary care provider or let us know so we can connect you with resources to help you quit.    Excision of skin lesions has these additional risks:    Failure to remove all the lesion with the need to do the procedure again to remove more    Regrowth of the lesion    The procedure has several limitations and expected outcomes including:     There will always be a permanent scar at the surgery sites that will never go away    The scar is usually at least 3 times longer than the length of the growth, lesion, or mass on your face or neck     The scar will be red for many months and will hopefully fade over time but there is a risk of persistent redness.     Additional skin may need to be removed during the procedure    Additional surgeries may be needed to obtain an optimal result    No reconstructive effort will be able to restore you perfectly, you will always be able to tell you had surgery at that site     Facial asymmetries will be present after the reconstruction      Want to schedule a procedure? please call our  at [phone] or [phone].  Questions? Please contact us at [phone] if you have questions or if we can be of service.    The patient indicated an understanding of the risks,  benefits, alternatives and limitations of the procedure and all questions were answered to their satisfaction.       Signature: Hernandez Arriaga MD         Again, thank you for allowing me to participate in the care of your patient.        Sincerely,        Hernandez Arriaga MD

## 2022-10-27 NOTE — PATIENT INSTRUCTIONS
Information about your procedure: Dr. Arriaga provided you with information pertaining to the procedure he offered you, the risks and limitations associated with the procedure, the benefits of the procedure, and possible alternative treatment choices including not having surgery. Please make sure you have carefully discussed the procedure with Dr. Arriaga, that all your questions have been answered, and that you completely understand the procedure before undergoing the procedure. If you have any questions after the visit, please call our office or return to see Dr. Arriaga in the office.   Alternative treatment: One alternative to the treatment you discussed with Dr. Arriaga is no further treatment.  Make sure you are satisfied with your understanding of the possible outcomes, consequences, and risks if no further treatment is performed.   Risks: All surgery has risks or complications from the procedure. The risks of your procedure include but are not limited to:   Bleeding  Infection  Damage to surrounding structures  Distortion or pulling of surrounding facial landmarks including your vermilion border or lip volume  Functional problems after surgery: lip weakness  Facial weakness  Facial numbness  Chronic pain  Unfavorable change in your appearance  Delayed healing  Partial or total skin loss around the surgery site  The incision pulling apart  Excessive scarring, scar widening, or scar growth  Extruded sutures or sutures spitting out of your skin  Discovery of a growth, cancer, or other unexpected condition requiring treatment  Allergic reaction  Unforeseen complications related to the procedure or anesthesia  Additional risks factors: none.  Smoking: if you smoke, all smoking should stop before your procedure to decrease the risk of healing problems after surgery. Even when smoking is successfully stopped prior to surgery, the risk of healing problems after your procedure is higher that someone who has never smokes  because of the long term damage caused by smoking. If you feel ready to quit and need help, please talk with your primary care provider or let us know so we can connect you with resources to help you quit.    Excision of skin lesions has these additional risks:  Failure to remove all the lesion with the need to do the procedure again to remove more  Regrowth of the lesion    The procedure has several limitations and expected outcomes including:   There will always be a permanent scar at the surgery sites that will never go away  The scar is usually at least 3 times longer than the length of the growth, lesion, or mass on your face or neck   The scar will be red for many months and will hopefully fade over time but there is a risk of persistent redness.   Additional skin may need to be removed during the procedure  Additional surgeries may be needed to obtain an optimal result  No reconstructive effort will be able to restore you perfectly, you will always be able to tell you had surgery at that site   Facial asymmetries will be present after the reconstruction      Want to schedule a procedure? please call our  at [phone] or [phone].  Questions? Please contact us at [phone] if you have questions or if we can be of service.

## 2022-11-01 ENCOUNTER — ANCILLARY PROCEDURE (OUTPATIENT)
Dept: MAMMOGRAPHY | Facility: CLINIC | Age: 42
End: 2022-11-01
Payer: COMMERCIAL

## 2022-11-01 DIAGNOSIS — Z12.31 VISIT FOR SCREENING MAMMOGRAM: ICD-10-CM

## 2022-11-01 PROCEDURE — 77063 BREAST TOMOSYNTHESIS BI: CPT | Performed by: RADIOLOGY

## 2022-11-01 PROCEDURE — 77067 SCR MAMMO BI INCL CAD: CPT | Performed by: RADIOLOGY

## 2022-11-17 ENCOUNTER — HOSPITAL ENCOUNTER (OUTPATIENT)
Facility: AMBULATORY SURGERY CENTER | Age: 42
Discharge: HOME | End: 2022-11-17
Attending: OTOLARYNGOLOGY | Admitting: OTOLARYNGOLOGY
Payer: COMMERCIAL

## 2022-11-17 VITALS
SYSTOLIC BLOOD PRESSURE: 125 MMHG | BODY MASS INDEX: 24.22 KG/M2 | RESPIRATION RATE: 16 BRPM | DIASTOLIC BLOOD PRESSURE: 76 MMHG | HEART RATE: 82 BPM | WEIGHT: 140 LBS | TEMPERATURE: 98.1 F | OXYGEN SATURATION: 100 %

## 2022-11-17 DIAGNOSIS — L98.9 SKIN LESION: ICD-10-CM

## 2022-11-17 PROCEDURE — 13151 CMPLX RPR E/N/E/L 1.1-2.5 CM: CPT | Performed by: OTOLARYNGOLOGY

## 2022-11-17 PROCEDURE — 40814 EXCISE/REPAIR MOUTH LESION: CPT

## 2022-11-17 PROCEDURE — G8918 PT W/O PREOP ORDER IV AB PRO: HCPCS

## 2022-11-17 PROCEDURE — 11441 EXC FACE-MM B9+MARG 0.6-1 CM: CPT | Mod: 51 | Performed by: OTOLARYNGOLOGY

## 2022-11-17 PROCEDURE — G8907 PT DOC NO EVENTS ON DISCHARG: HCPCS

## 2022-11-17 RX ORDER — LIDOCAINE HYDROCHLORIDE AND EPINEPHRINE 10; 10 MG/ML; UG/ML
INJECTION, SOLUTION INFILTRATION; PERINEURAL PRN
Status: DISCONTINUED | OUTPATIENT
Start: 2022-11-17 | End: 2022-11-17 | Stop reason: HOSPADM

## 2022-11-17 RX ORDER — MUPIROCIN 20 MG/G
OINTMENT TOPICAL PRN
Status: DISCONTINUED | OUTPATIENT
Start: 2022-11-17 | End: 2022-11-17 | Stop reason: HOSPADM

## 2022-11-17 RX ORDER — CEPHALEXIN 500 MG/1
500 CAPSULE ORAL 4 TIMES DAILY
Qty: 4 CAPSULE | Refills: 0 | Status: SHIPPED | OUTPATIENT
Start: 2022-11-17 | End: 2022-11-18

## 2022-11-17 RX ORDER — MUPIROCIN 20 MG/G
OINTMENT TOPICAL
Qty: 22 G | Refills: 1 | Status: SHIPPED | OUTPATIENT
Start: 2022-11-17 | End: 2023-01-05

## 2022-11-17 NOTE — DISCHARGE INSTRUCTIONS
Patient Instructions after Facial Surgery    Hernandez Arriaga MD    Please read and familiarize yourself with these instructions. By following them carefully, you will assist in obtaining the best possible result from your surgery. If questions arise, do not hesitate to contact with me and discuss your questions at any time.     Surgery Site Care:    [x] Incision care (for incisions away from the eyes): Apply the antibiotic ointment Bactroban (mupirocin) to all of your incisions every 2 hours while you are awake. The incisions should be covered at all times by ointment because new skin cells grow best across moist surfaces. Use the Bactroban ointment for 3 days. On the fourth day, stop using the Bactroban and switch to Vaseline ointment. Continue to apply the Vaseline ointment every 2 hours to your incisions while you are awake. The switch to Vasoline is made because occasionally patients can develop an allergy to Bactoban if used for a long time.     [] Incision care (for incisions around the eyes):  For incisions near the eyes, apply the antibiotic ointment Erythromycin ophthalmic to all of your incisions every 2 hours while you are awake. Erythromycin ophthalmic ointment is safe to have near or in your eyes. The incisions should be covered at all times by the ointment because skin cells grow best across moist surfaces.     [] Crusting: Avoid crust build-up along your incisions. If crusts form, apply ointment more frequently. Crust build-up is a sign that your incisions or wounds are too dry.    [] Bolster: A bolster has been applied to your reconstructive site to immobilize the area and promote healing. The bolster is usually removed at the first or second postoperative visit. It is very important to avoid bumping the bolster. Any trauma to the bolster may damage the skin graft. Apply the antibiotic ointment Bactroban (mupirocin) to the bolster every 2 hours while you are awake. Use the Bactroban ointment for 3  days. On the fourth day, stop using the Bactroban and switch to Vaseline ointment. Continue to apply the Vaseline ointment every 2 hours to your incisions while you are awake. The switch to Vaseline is made because occasionally patients can develop an allergy to Bactroban if used for a long time. If the bolster is near your eye, do not use Bactroban or Vasoline near the eye; rather use Erythromycin ointment on the part of the bolster near your eye. The bolster should be covered at all times by the ointment. Do not get the bolster wet in the shower or bath.      [] Granulation: Some areas of your face were left open to allow healing from the inside-out, a process known as granulation. To promote healing, the open area should be covered at all times by ointment. If the open area drys out, it will not heal properly. Apply the Bactroban ointment every 2 hours while you are awake to this area for 3 days. On the fourth day, stop using the Bactroban and switch to Vaseline ointment. Continue to apply the Vasoline oointment every 2 hours to the open areas while you are awake     [] Full Thickness Skin Graft Donor Site: A skin graft was removed from your ear, neck, face, leg, or groin. It has been partially or fully closed with sutures.     [] Steri-strips (wound tape) have been applied to the area. Keep this tape in place and dry until you see Dr. Arriaga.     [] Please keep this area moist at all times. Apply the antibiotic ointment Bactroban to this donor area every 2 hours while you are awake. Use the Bactroban ointment for 3 days. On the fourth day, stop using the Bactroban and switch to Vaseline ointment. Continue to apply the Vasoline ointment every 2 hours to the donor area while you are awake until you see Dr. Arriaga.    [] Split Thickness Skin Graft Donor Site: A skin graft was removed from your leg, arm, or waist. It is covered with a clear dressing. Bloody fluid will collect under the dressing. This fluid helps  promote healing. Try to keep the dressing and collected fluid in place for as long as possible. Do not disturb the dressing or get the dressing wet. If the fluid drains out, do not remove the dressing. Simply tape gauze over the leaking area to catch the fluid and keep your clothes clean. If there is concern about the dressing or healing of the skin graft site, please call our office.     [] Ear Cartilage [place]: Ear cartilage was removed from one or both of your ears and used to strengthen and shape your reconstruction. A gauze bolster has been sewn onto your ear to immobilize the area and promote healing. The bolster is usually removed at the first or second postoperative visit. It is very important to avoid bumping the bolster. Apply the antibiotic ointment Bactroban to the bolster every 2 hours while you are awake. Use the Bactroban ointment for 3 days. On the fourth day, stop using the Bactroban and switch to Vaseline ointment. Continue to apply the Vaseline ointment every 2 hours to your incisions while you are awake. The bolster should be covered at all times by the ointment. Do not get the bolster wet in the shower or bath.     [] Rib Cartilage [place]: Rib cartilage was removed from your chest and used to strengthen and your reconstruction. Steri-strips are across the incision. You may get these strips wet in the shower 72 hours after surgery. They will eventually fall off on their own.     [] Pressure Dressing: A pressure dressing was applied to your _____________________________.     [] Remove this pressure dressing ____ hours after surgery.     [] Keep this pressure dressing in place until your next visit to our office.    [] Drain: A drain was placed in your surgical site.  If this drain was removed in the hospital, leaking of fluid out of the wound where the drain exited is expected. You may get this area wet in the shower 72 hours after removal of the drain. Please call our office if the drainage is  foul smelling or the site becomes more red or painful. If the drain has not been removed, please call ____to arrange removal of the drain on ________     [] Ice Packs:     [x] Apply ice packs to your eyes and around the surgical site during the first 24 hours. The packs can rest gently on the surgery site but avoid traumatizing the surgical areas. Either a commercially available ice pack from your pharmacy or crushed ice in a plastic bag covered with a cloth may be used. Do not let the skin get too cool by keeping a cloth between the ice pack and your skin.     [] No Ice Packs: the cold could damage your skin.    Bathing Instructions:    Showering:    [] Do NOT get your surgery site(s) wet until approved by Dr. Arriaga. You may take a tub bath after surgery but you must keep your surgery site(s) dry.     [x] You may shower and wash your hair 72 hours after surgery. Use hypoallergenic shampoo (baby shampoo). Let warm, soapy shower water run over your face and incisions. Do not scrub or bump your surgery site(s). Gently pat your surgery site(s) dry with a towel. Apply ointment over your surgery sites after drying.    []  For patients with a bolster: You may wash your face, but carefully avoid the bolster. You may take a tub bath starting 48 hours after surgery but you must keep your bolster dry. Do NOT get the bolster wet.     Bath: You may take a bath 48 hours after surgery, but do not get any incisions wet in the bath for 6 weeks.     Facial Care Instructions:    Brushing:    [] You may brush your teeth normally.  [x] Brush your teeth gently with a soft small toothbrush.    Shave:    [] You may shave your entire face as normal.  [] Refrain from shaving around the surgical site until approved by Dr. Arriaga.  Makeup:    [] Resume normal makeup use.  [x] Do not wear makeup until approved by Dr. Arriaga.    Lipstick:  [x] Resume normal lipstick use.  [] Do not use lipstick for one week. Gently coat lips with Vaseline if  needed to treat dryness.    Facial movement:  [x] Resume your normal facial movements and speaking.  [] Avoid smiling, grinning, or excessive facial movement for one week.  [] Avoid long conversations or speaking on the phone for one week.  [] Avoid turning your head from side-to-side or up-and-down.    Hair dryer:  [x] You may use a hair dryer as usual.  [] Use a hair dryer on COOL setting only since you may not have full sensation in the operative areas.    Hair care:  [x] You may comb and color your hair as usual.  [] Be careful when combing your hair to avoid catching your comb in the suture line.  [] Hair coloring and permanents should be postponed until 4 weeks after surgery.    Activities:    Eating: Avoid foods that require prolonged chewing. Otherwise, your diet has no restrictions.    No smoking. Tobacco or any product with nicotine (patch or gum) can severely impair the healing process and result in a poor surgical result.    Alcohol. No alcohol consumption until all bandages and sutures have been removed and you have fully recovered from surgery.    Rest. Obtain more rest than usual.    Strenuous activities. Avoid any straining, bending, lifting over 15 lbs. and other activities that will raise your blood pressure or pulse because this may cause unnecessary bleeding. Do not resume your activities until approved by Dr. Arriaga. You can usually resume light activities 1 week after your surgery, but you must continue to avoid any activity that will raise your blood pressure or pulse because this may cause unnecessary bleeding. Four weeks after surgery you can usually begin to slowly resume your usual activities with the goal of returning to all activities 6 weeks after surgery.    Head elevated. Keep your head elevated to reduce swelling and drainage. When resting, lie on 3 pillows or sleep in a recliner.     Driving. Do not drive until you have stopped all pain medications, are pain free, and can turn your  head fully in all directions.    Avoid trauma. Be careful not to bump your surgery site(s).    Sun protection. Avoid sun or sunlamps for 6 weeks after surgery. Heat may cause your surgery site to swell. After that, please wear a hat and use sunscreen when exposed to sunlight (SPF of 30 or greater is recommended).    Swimming. Do not go swimming for 6 weeks.    Travel. Avoid travel for 6 weeks after surgery.      Medication Instructions:     [x] Tylenol: Please take over-the-counter Tylenol (acetaminophen) for pain as instructed on the packaging. Never take more Tylenol than the packaging says is safe.      [x] Ibuprofen: You can also take over-the-counter Ibuprofen (Motrin, Advil) for pain as instructed on the packaging. Never take more Ibuprofen than the packaging says is safe.     [] Moderate to Severe Pain: A prescription pain medication has been prescribed. Only take this prescription pain medication if you are experiencing moderate to serve pain despite taking over the counter pain medications like Tylenol or Ibuprofen. Take this prescription medication as needed according to your pharmacist's instructions.     [] Avoid Excessive Tylenol: The prescription pain medication that was prescribed after surgery or that you already take has Tylenol in it. Taking this prescription pain medication and over-the-counter Tylenol may result in Tylenol overdose and damage to your liver. Avoid too much Tylenol. If questions arise about pain management call Dr. Arriaga or talk with your pharmacist.     [] Avoid Aspirin: Do not take Aspirin or pain medications that contain Aspirin for 7 days after surgery. These medications will thin your blood and may cause a hematoma or excessive bleeding.     [] Avoid NSAIDs: Do not take Non-Steroidal Anti-Inflammatory Drugs (NSAIDs) such as Advil, Excedrine, Ibuprofen, Alieve, Naproxen, etc  These medications will thin your blood and may cause a hematoma or excessive bleeding     [x]  Antibiotics: Antibiotics have been prescribed to prevent infection. Take the antibiotics according to your pharmacist's instructions.     [] Blood Thinners: You usually take a blood thinner. Your primary care provider or specialist who prescribes your blood thinners has created an anticoagulation plan for you around the time of your surgery. Please follow those instructions.       Your blood thinning medication plan includes: ________.    If your primary care provider feels that you need to restart your blood thinner before the planned restarting date, please contact Dr. Arriaga or his office staff immediately. If you do not understand what to do with your blood thinners, call our office, or Dr. Arriaga immediately!    [x] Resume your usual medications unless otherwise instructed.    [] Other: _________________________________________________________      What to Expect During Healing:    Bleeding: There may be a fair amount of bloody fluid oozing from your surgery site. You will be applying ointment frequently to the surgery sites. As the ointment heats to your body temperature, it becomes runny and mixes with bloody secretions to form a pink colored fluid. This is very common and slows down during the first 3-5 days after surgery. Also during the first 72 hours, drips of blood commonly form at your surgery site. Continue to apply ointment to prevent those drips from hardening into crusts. If you have bright red blood constantly dripping or pouring down your face, please call Dr. Arriaga, Dr. Arriaga's office, 911, or go to your nearest Emergency Department.    Bruising and Swelling: Bruising or swelling around the surgery site is common and it can extend well into the face and neck. If you have rapid swelling or bruising, or areas that turn hard and dark purple, please call Dr. Arriaga or his office immediately. Mild bruising will usually dissipate in several weeks. In certain patients it may require 6 months for all  swelling to subside completely. Be aware that when you expose a bruise to sunlight it can cause permanent discoloration to the skin. Please wear a hat and use sunscreen when exposed to sunlight (SPF of 30 or greater is recommended).     Discomfort: There will be discomfort after surgery, most of which will be within 1-2 days after the procedure. After the first 48 hours, your pain should slowly start to improve. If after this time you experience significantly worsening pain, please call Dr. Arriaga's office or Dr. Arriaga immediately.    Redness: The surgery site may remain pink for one year as new blood vessels grow into the area to help it heal. This redness will likely fade over time.     Numbness: It is normal to experience some numbness near the incisions. This could last several months but usually resolves over time.    Buried Sutures: On occasion a suture under the skin can resurface a month or two later. If this happens, please call and we will schedule an appointment to have it removed.    Tightness: Your surgical site may feel tight after the procedure. On average, it takes 1 to 6 months for the skin to start to relax, and one complete year for full relaxation of the skin. This will vary with each patient.    Please call the office or Dr. Arriaga directly if you have a fever over 101?F, excessive swelling, vision changes, severe pain, excessive bleeding, any other unexpected problem, or an injury to your surgery area.    Follow-up Appointment: please call    [] [phone] or [phone] for an appointment at [facility] 4th Floor ENT Clinic with       [] Dr. Arriaga or ENT Physician Assistant (PA) on _____________      [] Dr. Arriaga or ENT Physician Assistant (PA) on _____________    [x] [phone] or [phone] for an appointment at [facility] with       [x] ENT Nurse in about 1 week.      [] Dr. Arriaga or ENT Nurse on _____________      Please contact our office or me  anytime if you have questions or if I can be of service.    Sincerely,    Hernandez Arriaga MD    If you have questions or concerns during business hours, please call the office where you had surgery:  [facility], ENT Clinic: [phone].  [facility] ENT Clinic: [phone].    If it is an emergency or after business hours and you need to talk with Dr. Arriaga, please call:  Dr. Arriaga's cell phone: [phone]. I will not be able to answer it if I am operating. You can leave a message for me to call you back when I am available. If I don't answer and you need help right away, call the pager    Pager : [phone], ask for Dr. Arriaga to be paged. If Dr. Arriaga not available, ask to talk to the ENT resident  on call.

## 2022-11-17 NOTE — PROGRESS NOTES
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  Medical Decision Making (MDM): (undiagnosed problem with uncertain prognosis) Excised and reconstructed today without complication.   Care Checklist:  _We discussed the goal of leaving slight excess tissue in the reconstruction to avoid a cleft or groove postoperatively due to the reconstruction. She may require a second stage surgery to address any deformity caused by residual tissue fullness or scar widening.  _Path from 11/17/22  _RTC 1 week with ENT nurse for suture removal, then follow up with Dr. Arriaga the following week.   _Pain management: Tylenol and Ibuprofen.     Background/Connection:   Preferred name = Zulma  What is most important to know about you as a person? 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 Works for [employer].      Photographs:  consents signed 10/27/22    Hernandez Arriaga MD

## 2022-11-17 NOTE — OP NOTE
Date: 11/17/22    Surgeon: Hernandez Arriaga MD    Preoperative diagnosis:   Left lower lip mucosal and cutaneous mass, measuring 6x4mm.    Postoperative diagnosis: same.    Procedure:  Wide local excision of left lower lip mucosal and skin mass 6x4mm.  Complex closure, left lower lip wound measuring 15mm in length.    Operative Findings: encapsulated mass consistent with a cyst removed in one complete block without violating the capsule. The mass was right a the vermilion border and involved both the mucosa and cutaneous portions of the lip.     A preoperative discussion was held. Ms. Coronado stated she understood the risks, benefits, alternatives, and limitations of the procedure. The risks, including but not limited to, bleeding, infection, damage to surrounding structures, facial nerve damage, chronic pain, sensation loss, scarring, positive margins, need for further resection or revision surgeries, regrowth of the mass, or unforseen complications related to surgery were discussed. She stated that her questions were answered to her satisfaction. She wished to undergo the procedure. She had a right nose ring that needed to be removed to safely use cautery. She was unable to remove it but requested I remove it in the OR. She was OK if the ring got damaged.     Procedure: the patient was marked for the proposed excision oriented radially along the relaxed skin tension lines in that region of the face. This was shown to the patient in the mirror and she agreed with this plan.     After informed consent was obtained and placed in chart, the patient was brought to the operating room, placed in supine position. The nose ring was removed without complication and saved to be returned to the patient. The patient was prepped and draped in the standard sterile fashion, which included injection of 1% lidocaine with 1:100,000 epinephrine at the proposed operative site. A time out was performed. The correct patient and  procedure were identified.    The procedure began with excision of the left lower lip mass. The skin overlying the mass was excised in a fusiform fashion oriented along the relaxed skin tension lines with a 15 blade scalpel. The deep aspect was dissected with an iris scissors in the subcutaneous plane and then along the capsule of the mass. The mass was removed in one complete piece without violating the capsule. Complete hemostasis was obtained with a bipolar cautery. The specimen was sent for routine pathology.     Attention then turned towards closure of this complex wound. The periphery was undermined over the width of the maximal width of the wound with an iris scissors. The wound was then closed in multiple layers with multiple interrupted 5-0 vicryl sutures in the deep layer and running subcuticular 5-0 prolene suture in the superficial layer. Antibiotic ointment was applied over the wound site.     Postoperative instructions were provided. She should follow up next week for suture removal. I encouraged her to call or return sooner if she has questions or if problems arise.     Hernandez Arriaga MD

## 2022-11-17 NOTE — BRIEF OP NOTE
[facility] Brief Operative Note    Pre-operative diagnosis: Skin lesion [L98.9]   Post-operative diagnosis left lower lip lesion    Procedure: Procedure(s):  Left lower lip lesion excision, complex closure,   Surgeon(s): Surgeon(s) and Role:     * Hernandez Arriaga MD - Primary   Estimated blood loss: * No values recorded between 11/17/2022  9:55 AM and 11/17/2022 10:21 AM *    Specimens: ID Type Source Tests Collected by Time Destination   1 : left lower lip lesion Tissue Mouth SURGICAL PATHOLOGY EXAM Hernandez Arriaga MD 11/17/2022  9:58 AM       Findings: Lower lip lesion with the visual appearance of a cyst.

## 2022-11-20 ENCOUNTER — HEALTH MAINTENANCE LETTER (OUTPATIENT)
Age: 42
End: 2022-11-20

## 2022-11-22 ENCOUNTER — ALLIED HEALTH/NURSE VISIT (OUTPATIENT)
Dept: NURSING | Facility: CLINIC | Age: 42
End: 2022-11-22
Payer: COMMERCIAL

## 2022-11-22 DIAGNOSIS — L98.9 SKIN LESION: Primary | ICD-10-CM

## 2022-11-22 PROCEDURE — 99207 PR NO CHARGE NURSE ONLY: CPT

## 2022-11-22 ASSESSMENT — PAIN SCALES - GENERAL: PAINLEVEL: NO PAIN (0)

## 2022-11-22 NOTE — PROGRESS NOTES
Pt here for suture removal from left lower lip cyst excision site. Wound appears intact, no unexpected redness, swelling or drainage noted. Sutures removed without difficulty.  Wound check appointment with Dr Arriaga scheduled 12/1/22. Alicia Mitchell RN

## 2022-11-23 LAB
PATH REPORT.COMMENTS IMP SPEC: NORMAL
PATH REPORT.FINAL DX SPEC: NORMAL
PATH REPORT.GROSS SPEC: NORMAL
PATH REPORT.MICROSCOPIC SPEC OTHER STN: NORMAL
PATH REPORT.RELEVANT HX SPEC: NORMAL
PHOTO IMAGE: NORMAL

## 2022-11-23 PROCEDURE — 88305 TISSUE EXAM BY PATHOLOGIST: CPT | Performed by: PATHOLOGY

## 2022-11-23 PROCEDURE — 88312 SPECIAL STAINS GROUP 1: CPT | Performed by: PATHOLOGY

## 2022-11-23 NOTE — PROGRESS NOTES
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  Medical Decision Making (MDM): (undiagnosed problem with uncertain prognosis) Recovering appropriately without complication. We discussed the importance of sun protection especially at the reconstruction site. She should follow up with me in 4-6 weeks for continued monitoring. I encouraged her to call or return sooner if questions arise or if I can be of service.  Care Checklist:  _We discussed the goal of leaving slight excess tissue in the reconstruction to avoid a cleft or groove postoperatively due to the reconstruction. She may require a second stage surgery to address any deformity caused by residual tissue fullness or scar widening.  _RTC 4-6 weeks.   _Pain 1/10. Pain management: Tylenol and Ibuprofen.     Background/Connection:   Preferred name = Zulma  What is most important to know about you as a person? 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 Works for [employer].      Photographs: [facility] consents signed 10/27/22    Hernandez Arriaga MD       Facial Plastic and Reconstructive Surgery Note    Today I had the pleasure of seeing the patient at the Facial Plastic and Reconstructive Surgery Clinic in the Department of Otolaryngology at [facility]. She follows up after undergoing lesion excision. The patient has been doing well. No pain, bleeding or discharge from the wound.    Physical examination:   The tissue at the left lower lip excision and reconstruction site is 100% viable with adequate color and capillary refill. The incision is clean, dry, and intact. No evidence of hematoma or infection.  Vermilion border is well aligned.     Hernandez Arriaga MD

## 2022-12-01 ENCOUNTER — OFFICE VISIT (OUTPATIENT)
Dept: OTOLARYNGOLOGY | Facility: CLINIC | Age: 42
End: 2022-12-01
Payer: COMMERCIAL

## 2022-12-01 DIAGNOSIS — L98.9 SKIN LESION: Primary | ICD-10-CM

## 2022-12-01 PROCEDURE — 99024 POSTOP FOLLOW-UP VISIT: CPT | Performed by: OTOLARYNGOLOGY

## 2022-12-01 ASSESSMENT — PAIN SCALES - GENERAL: PAINLEVEL: NO PAIN (0)

## 2022-12-01 NOTE — NURSING NOTE
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    There were no vitals taken for this visit.    Do you need any medication refills at today's visit? No    Alicia Mitchell RN

## 2022-12-01 NOTE — LETTER
12/1/2022         RE: Zulma Coronado  [address] [facility] [address]        Dear Colleague,    Thank you for referring your patient, Zulma Coronado, to the [facility]. Please see a copy of my visit note below.    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  Medical Decision Making (MDM): (undiagnosed problem with uncertain prognosis) Recovering appropriately without complication. We discussed the importance of sun protection especially at the reconstruction site. She should follow up with me in 4-6 weeks for continued monitoring. I encouraged her to call or return sooner if questions arise or if I can be of service.  Care Checklist:  _We discussed the goal of leaving slight excess tissue in the reconstruction to avoid a cleft or groove postoperatively due to the reconstruction. She may require a second stage surgery to address any deformity caused by residual tissue fullness or scar widening.  _RTC 4-6 weeks.   _Pain 1/10. Pain management: Tylenol and Ibuprofen.     Background/Connection:   Preferred name = Zulma  What is most important to know about you as a person? (No medical information) College daughter at [employer] and middle school child. Works for [employer].      Photographs: [facility] consents signed 10/27/22    Hernandez Arriaga MD       Facial Plastic and Reconstructive Surgery Note    Today I had the pleasure of seeing the patient at the Facial Plastic and Reconstructive Surgery Clinic in the Department of Otolaryngology at [facility]. She follows up after undergoing lesion excision. The patient has been doing well. No pain, bleeding or discharge from the wound.    Physical examination:   The tissue at the left lower lip excision and reconstruction site is  100% viable with adequate color and capillary refill. The incision is clean, dry, and intact. No evidence of hematoma or infection.  Vermilion border is well aligned.     Hernandez Arriaga MD        Again, thank you for allowing me to participate in the care of your patient.        Sincerely,        Hernandez Arriaga MD

## 2023-01-05 ENCOUNTER — OFFICE VISIT (OUTPATIENT)
Dept: OTOLARYNGOLOGY | Facility: CLINIC | Age: 43
End: 2023-01-05
Payer: COMMERCIAL

## 2023-01-05 VITALS — HEART RATE: 86 BPM | SYSTOLIC BLOOD PRESSURE: 135 MMHG | DIASTOLIC BLOOD PRESSURE: 84 MMHG

## 2023-01-05 DIAGNOSIS — L98.9 SKIN LESION: Primary | ICD-10-CM

## 2023-01-05 PROCEDURE — 99024 POSTOP FOLLOW-UP VISIT: CPT | Performed by: OTOLARYNGOLOGY

## 2023-01-05 NOTE — PROGRESS NOTES
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  Medical Decision Making (MDM): (undiagnosed problem with uncertain prognosis) Recovering appropriately without complication. Both she and I are very satisfied with the reconstruction. We discussed the importance of sun protection especially at the reconstruction site. She should follow up with me on an as needed basis. I encouraged her to call or return anytime if she has questions or if I can be of service. It has been a pleasure to participate in the care of Ms. Coronado.  Care Checklist:  _RTC as needed  _Pain 0/10. Pain management: Tylenol and Ibuprofen.     Background/Connection:   Preferred name = Zulma  What is most important to know about you as a person? (No medical information) College daughter at [employer] and middle school child. Works for [employer].      Photographs: [facility] consents signed 10/27/22    Hernandez Arriaga MD          Facial Plastic and Reconstructive Surgery Note    Today I had the pleasure of seeing the patient at the Facial Plastic and Reconstructive Surgery Clinic in the Department of Otolaryngology at [facility]. She follows up after undergoing lesion excision. The patient has been doing well. No pain, bleeding or discharge from the wound. She is very satisfied with the reconstruction site.    Physical examination:   The tissue at the left lower lip excision and reconstruction site is 100% viable with adequate color and capillary refill. The incision is clean, dry, and intact. No evidence of hematoma or infection. No excess fullness of the mucosa. Appropriate contour of the lip.     Hernandez Arriaga MD  Facial Plastic and Reconstructive Surgery  Department of Otolaryngology  [facility]

## 2023-01-05 NOTE — NURSING NOTE
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    /84   Pulse 86   Data Unavailable        Allergies   Allergen Reactions     No Known Drug Allergies          Do you need any medication refills at today's visit?

## 2023-01-05 NOTE — LETTER
1/5/2023         RE: Zulma Coronado  [address] [facility] [address]        Dear Colleague,    Thank you for referring your patient, Zulma Coronado, to the [facility]. Please see a copy of my visit note below.    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  Medical Decision Making (MDM): (undiagnosed problem with uncertain prognosis) Recovering appropriately without complication. Both she and I are very satisfied with the reconstruction. We discussed the importance of sun protection especially at the reconstruction site. She should follow up with me on an as needed basis. I encouraged her to call or return anytime if she has questions or if I can be of service. It has been a pleasure to participate in the care of Ms. Coronado.  Care Checklist:  _RTC as needed  _Pain 0/10. Pain management: Tylenol and Ibuprofen.     Background/Connection:   Preferred name = Zulma  What is most important to know about you as a person? (No medical information) College daughter at [employer] and [employer] school child. Works for [employer].      Photographs: [facility] consents signed 10/27/22    Hernandez Arriaga MD          Facial Plastic and Reconstructive Surgery Note    Today I had the pleasure of seeing the patient at the Facial Plastic and Reconstructive Surgery Clinic in the Department of Otolaryngology at [facility]. She follows up after undergoing lesion excision. The patient has been doing well. No pain, bleeding or discharge from the wound. She is very satisfied with the reconstruction site.    Physical examination:   The tissue at the left lower lip excision and reconstruction site is 100% viable with adequate color and capillary refill. The incision is clean, dry, and intact. No  evidence of hematoma or infection. No excess fullness of the mucosa. Appropriate contour of the lip.     Hernandez Arriaga MD  Facial Plastic and Reconstructive Surgery  Department of Otolaryngology  [facility]      Again, thank you for allowing me to participate in the care of your patient.        Sincerely,        Hernandez Arriaga MD

## 2023-01-08 DIAGNOSIS — K21.9 GASTROESOPHAGEAL REFLUX DISEASE WITHOUT ESOPHAGITIS: ICD-10-CM

## 2023-01-10 RX ORDER — FAMOTIDINE 20 MG/1
TABLET, FILM COATED ORAL
Qty: 180 TABLET | Refills: 0 | Status: SHIPPED | OUTPATIENT
Start: 2023-01-10 | End: 2023-04-10

## 2023-01-30 NOTE — PATIENT INSTRUCTIONS
If you have labs or imaging done, the results will automatically release in [employer] without an interpretation.  Your health care professional will review those results and send an interpretation with recommendations as soon as possible, but this may be 1-3 business days.    If you have any questions regarding your visit, please contact your care team.     [employer] [facility]: [phone]  [facility] CLINIC HOURS TELEPHONE NUMBER       Katt Rush MD  Assistant Medical Director    [facility] - Certified Medical Assistant     Magdalene Perrin-SULLY Thorne-  Lilly-     Monday- [place]  8:00 a.m - 5:00 p.m    Tuesday- Surgery        Thursday- [place]  8:00 a.m - 5:00 p.m.    Friday- [place]  7:30 a.m - 4:00 p.m. [facility]  [address]  [place], [place] [address] [phone] Fax  Imaging Scheduling [phone]    [facility]  [address] [place] [address] Dr.  [place], [place] [address] [phone]    [address] [address] [phone] Fax  Imaging Scheduling [phone]     Urgent Care locations:  [facility] Monday-Friday  10 am - 8 pm  Saturday and Sunday   9 am - 5 pm  Monday-Friday   10 am - 8 pm  Saturday and Sunday   9 am - 5 pm   [phone]     **Surgeries** Our Surgery Schedulers will contact you to schedule. If you do not receive a call within 3 business days, please call [phone].    If you need a medication refill, please contact your pharmacy. Please allow 3 business days for your refill to be completed.    As always, thank you for trusting us with your healthcare needs!

## 2023-02-01 ASSESSMENT — ENCOUNTER SYMPTOMS
JOINT SWELLING: 0
WEAKNESS: 0
CHILLS: 0
FEVER: 0
FREQUENCY: 0
PARESTHESIAS: 0
MYALGIAS: 0
SORE THROAT: 0
NERVOUS/ANXIOUS: 0
ABDOMINAL PAIN: 0
HEADACHES: 0
DYSURIA: 0
DIARRHEA: 0
DIZZINESS: 0
SHORTNESS OF BREATH: 0
HEARTBURN: 0
COUGH: 0
PALPITATIONS: 0
NAUSEA: 0
ARTHRALGIAS: 0
EYE PAIN: 0
HEMATURIA: 0
HEMATOCHEZIA: 0
BREAST MASS: 0
CONSTIPATION: 0

## 2023-02-03 ENCOUNTER — OFFICE VISIT (OUTPATIENT)
Dept: OBGYN | Facility: CLINIC | Age: 43
End: 2023-02-03
Payer: COMMERCIAL

## 2023-02-03 VITALS
HEART RATE: 74 BPM | HEIGHT: 64 IN | WEIGHT: 140 LBS | SYSTOLIC BLOOD PRESSURE: 144 MMHG | BODY MASS INDEX: 23.9 KG/M2 | DIASTOLIC BLOOD PRESSURE: 88 MMHG

## 2023-02-03 DIAGNOSIS — Z01.419 WELL FEMALE EXAM WITH ROUTINE GYNECOLOGICAL EXAM: Primary | ICD-10-CM

## 2023-02-03 DIAGNOSIS — Z12.11 COLON CANCER SCREENING: ICD-10-CM

## 2023-02-03 DIAGNOSIS — Z12.4 ENCOUNTER FOR SCREENING FOR CERVICAL CANCER: ICD-10-CM

## 2023-02-03 PROCEDURE — 88175 CYTOPATH C/V AUTO FLUID REDO: CPT | Performed by: OBSTETRICS & GYNECOLOGY

## 2023-02-03 PROCEDURE — 99396 PREV VISIT EST AGE 40-64: CPT | Performed by: OBSTETRICS & GYNECOLOGY

## 2023-02-03 PROCEDURE — 87624 HPV HI-RISK TYP POOLED RSLT: CPT | Performed by: OBSTETRICS & GYNECOLOGY

## 2023-02-03 ASSESSMENT — ENCOUNTER SYMPTOMS
HEADACHES: 0
FEVER: 0
SORE THROAT: 0
HEMATURIA: 0
PALPITATIONS: 0
FREQUENCY: 0
COUGH: 0
ABDOMINAL PAIN: 0
DYSURIA: 0
NAUSEA: 0
CHILLS: 0
ARTHRALGIAS: 0
SHORTNESS OF BREATH: 0
HEARTBURN: 0
BREAST MASS: 0
DIARRHEA: 0
NERVOUS/ANXIOUS: 0
CONSTIPATION: 0
JOINT SWELLING: 0
DIZZINESS: 0
WEAKNESS: 0
EYE PAIN: 0
PARESTHESIAS: 0
MYALGIAS: 0
HEMATOCHEZIA: 0

## 2023-02-03 NOTE — PROGRESS NOTES
SUBJECTIVE:   CC: Zulma is an 42 year old who presents for preventive health visit.   Patient has been advised of split billing requirements and indicates understanding: Yes  Healthy Habits:     Getting at least 3 servings of Calcium per day:  Yes    Bi-annual eye exam:  Yes    Dental care twice a year:  Yes    Sleep apnea or symptoms of sleep apnea:  None    Diet:  Regular (no restrictions)    Frequency of exercise:  2-3 days/week    Duration of exercise:  15-30 minutes    Taking medications regularly:  Yes    Medication side effects:  Not applicable    PHQ-2 Total Score: 0    Additional concerns today:  No     No history of GDM or GHTN.  Vasectomy for contraception with current partner    I removed her Mirena IUD 22 due to breakthrough bleeding. It had been placed at her annual visit last year in 2022 for management of abnormal uterine bleeding. She had used the Mirena in the past prior to that, removed in . Monthly cycles are normal now.   I have also seen her in the past for lichen     Today's PHQ-2 Score:   PHQ-2 (  [employer]) 2023   Q1: Little interest or pleasure in doing things 0   Q2: Feeling down, depressed or hopeless 0   PHQ-2 Score 0   PHQ-2 Total Score (12-17 Years)- Positive if 3 or more points; Administer PHQ-A if positive -   Q1: Little interest or pleasure in doing things Not at all   Q2: Feeling down, depressed or hopeless Not at all   PHQ-2 Score 0           Social History     Tobacco Use     Smoking status: Never     Smokeless tobacco: Never   Substance Use Topics     Alcohol use: Yes     Comment: rarely     If you drink alcohol do you typically have >3 drinks per day or >7 drinks per week? No    No flowsheet data found.      BP Readings from Last 3 Encounters:   23 (!) 144/88   23 135/84   22 125/76    Wt Readings from Last 3 Encounters:   23 63.5 kg (140 lb)   22 63.5 kg (140 lb)   10/03/22 64.6 kg (142 lb 8 oz)                  Patient  Active Problem List   Diagnosis     Gastroesophageal reflux disease without esophagitis     ASCUS with positive high risk HPV cervical     Family history of ulcerative colitis     Skin lesion     Past Surgical History:   Procedure Laterality Date      SECTION       COMBINED ESOPHAGOSCOPY, GASTROSCOPY, DUODENOSCOPY (EGD) WITH CO2 INSUFFLATION N/A 2019    Procedure: COMBINED ESOPHAGOSCOPY, GASTROSCOPY, DUODENOSCOPY (EGD) WITH CO2 INSUFFLATION;  Surgeon: Chacorta Nunez MD;  Location: [facility] OR     ESOPHAGOSCOPY, GASTROSCOPY, DUODENOSCOPY (EGD), COMBINED N/A 2019    Procedure: Combined Esophagoscopy, Gastroscopy, Duodenoscopy (Egd), Biopsy Single Or Multiple;  Surgeon: Chacorta Nunez MD;  Location: [facility] OR     EXCISE LESION FACE Left 2022    Procedure: Left lower lip lesion excision, complex closure,;  Surgeon: Hernandez Arriaga MD;  Location: [facility] OR       Social History     Tobacco Use     Smoking status: Never     Smokeless tobacco: Never   Substance Use Topics     Alcohol use: Yes     Comment: rarely     Family History   Problem Relation Age of Onset     Arthritis Mother         fibromyalgia     Crohn's Disease Mother      Ulcerative Colitis Mother      Inflammatory Bowel Disease Mother      Cerebrovascular Disease Maternal Grandfather      Inflammatory Bowel Disease Daughter          Current Outpatient Medications   Medication Sig Dispense Refill     famotidine (PEPCID) 20 MG tablet TAKE 1 TABLET BY MOUTH TWICE A  tablet 0     hydrOXYzine (ATARAX) 25 MG tablet Take 1 tablet (25 mg) by mouth every 8 hours as needed for anxiety 30 tablet 1     Allergies   Allergen Reactions     No Known Drug Allergies      Recent Labs   Lab Test 06/10/22  1130 10/11/21  1357 21  0942   A1C 5.1  --   --    LDL  --   --  113*   HDL  --   --  53   TRIG  --   --  66   CR 0.85  --   --    GFRESTIMATED 86  --   --    POTASSIUM 4.5  --   --    TSH 2.29 1.24  --         Breast Cancer  Screening:    FHS-7:   Breast CA Risk Assessment (FHS-7) 10/18/2021 11/1/2022   Did any of your first-degree relatives have breast or ovarian cancer? No No   Did any of your relatives have bilateral breast cancer? No No   Did any man in your family have breast cancer? No No   Did any woman in your family have breast and ovarian cancer? No No   Did any woman in your family have breast cancer before age 50 y? No No   Do you have 2 or more relatives with breast and/or ovarian cancer? No No   Do you have 2 or more relatives with breast and/or bowel cancer? No No      Mammogram Screening - Offered annual screening and updated Health Maintenance for mutual plan based on risk factor consideration    Pertinent mammograms are reviewed under the imaging tab.    History of abnormal Pap smear: YES - updated in Problem List and Health Maintenance accordingly  PAP / HPV Latest Ref Rng & Units 12/21/2020 8/23/2019   PAP (Historical) - NIL NIL   HPV16 NEG:Negative Negative Negative   HPV18 NEG:Negative Negative Negative   HRHPV NEG:Negative Negative Positive(A)          Review of Systems   Constitutional: Negative for chills and fever.   HENT: Negative for congestion, ear pain, hearing loss and sore throat.    Eyes: Negative for pain and visual disturbance.   Respiratory: Negative for cough and shortness of breath.    Cardiovascular: Negative for chest pain, palpitations and peripheral edema.   Gastrointestinal: Negative for abdominal pain, constipation, diarrhea, heartburn, hematochezia and nausea.   Breasts:  Negative for tenderness, breast mass and discharge.   Genitourinary: Negative for dysuria, frequency, genital sores, hematuria, pelvic pain, urgency, vaginal bleeding and vaginal discharge.   Musculoskeletal: Negative for arthralgias, joint swelling and myalgias.   Skin: Negative for rash.   Neurological: Negative for dizziness, weakness, headaches and paresthesias.   Psychiatric/Behavioral: Negative for mood changes. The  "patient is not nervous/anxious.        OBJECTIVE:   BP (!) 144/88 (BP Location: Right arm, Cuff Size: Adult Regular)   Pulse 74   Ht 1.613 m (5' 3.5\")   Wt 63.5 kg (140 lb)   LMP 01/19/2023   BMI 24.41 kg/m    Physical Exam  Gen: Alert and oriented times 3, no acute distress.  Well developed, well nourished, pleasant.    Neck: Supple, no masses.  No thyromegaly.  Breast: Symmetrical without lesions.  No dimpling, nipple discharge, or discrete masses.  No lymphadenopathy.  Chest:  Non labored.  Clear to auscultation bilaterally.    Heart: Regular, normal S1, S2.  No murmurs.   Abdomen: Soft, nontender, nondistended.  No hepatosplenomegaly.    :  Normal female external genitalia.  No lesions.  Urethral meatus normal.  Speculum exam reveals a normal vaginal vault, normal cervix .  No abnormal discharge.  Bimanual exam reveals a normal, mobile, nontender uterus .  No cervical motion tenderness.  Adnexa nontender with no palpable masses.    Extremities:  Nontender, no edema.    Pap obtained:  Yes     ASSESSMENT/PLAN:       ICD-10-CM    1. Well female exam with routine gynecological exam  Z01.419       2. Encounter for screening for cervical cancer  Z12.4 Pap diagnostic with HPV      3. Colon cancer screening  Z12.11 Fecal colorectal cancer screen (FIT)           She has annual skin checks with derm.        COUNSELING:  Reviewed preventive health counseling, as reflected in patient instructions        She reports that she has never smoked. She has never used smokeless tobacco.          Katt Rush MD  [facility] [place]  " Paramedian Forehead Flap Text: A decision was made to reconstruct the defect utilizing an interpolation axial flap and a staged reconstruction.  A telfa template was made of the defect.  This telfa template was then used to outline the paramedian forehead pedicle flap.  The donor area for the pedicle flap was then injected with anesthesia.  The flap was excised through the skin and subcutaneous tissue down to the layer of the underlying musculature.  The pedicle flap was carefully excised within this deep plane to maintain its blood supply.  The edges of the donor site were undermined.   The donor site was closed in a primary fashion.  The pedicle was then rotated into position and sutured.  Once the tube was sutured into place, adequate blood supply was confirmed with blanching and refill.  The pedicle was then wrapped with xeroform gauze and dressed appropriately with a telfa and gauze bandage to ensure continued blood supply and protect the attached pedicle.

## 2023-02-08 LAB
BKR LAB AP GYN ADEQUACY: NORMAL
BKR LAB AP GYN INTERPRETATION: NORMAL
BKR LAB AP HPV REFLEX: NORMAL
BKR LAB AP LMP: NORMAL
BKR LAB AP PREVIOUS ABNL DX: NORMAL
BKR LAB AP PREVIOUS ABNORMAL: NORMAL
PATH REPORT.COMMENTS IMP SPEC: NORMAL
PATH REPORT.COMMENTS IMP SPEC: NORMAL
PATH REPORT.RELEVANT HX SPEC: NORMAL

## 2023-02-10 LAB
HUMAN PAPILLOMA VIRUS 16 DNA: NEGATIVE
HUMAN PAPILLOMA VIRUS 18 DNA: NEGATIVE
HUMAN PAPILLOMA VIRUS FINAL DIAGNOSIS: NORMAL
HUMAN PAPILLOMA VIRUS OTHER HR: NEGATIVE

## 2023-02-13 PROBLEM — R87.810 ASCUS WITH POSITIVE HIGH RISK HPV CERVICAL: Status: ACTIVE | Noted: 2019-08-23

## 2023-02-13 PROBLEM — R87.610 ASCUS WITH POSITIVE HIGH RISK HPV CERVICAL: Status: ACTIVE | Noted: 2019-08-23

## 2023-04-08 DIAGNOSIS — K21.9 GASTROESOPHAGEAL REFLUX DISEASE WITHOUT ESOPHAGITIS: ICD-10-CM

## 2023-04-10 RX ORDER — FAMOTIDINE 20 MG/1
TABLET, FILM COATED ORAL
Qty: 180 TABLET | Refills: 0 | Status: SHIPPED | OUTPATIENT
Start: 2023-04-10 | End: 2023-07-05

## 2023-07-21 ASSESSMENT — ENCOUNTER SYMPTOMS
ABDOMINAL PAIN: 0
COUGH: 0
DYSURIA: 0
NAUSEA: 0
BREAST MASS: 0
DIARRHEA: 0
HEMATOCHEZIA: 0
CONSTIPATION: 0
SORE THROAT: 0
EYE PAIN: 0
SHORTNESS OF BREATH: 0
PARESTHESIAS: 0
ARTHRALGIAS: 0
FEVER: 0
HEADACHES: 0
HEMATURIA: 0
WEAKNESS: 0
HEARTBURN: 0
FREQUENCY: 0
PALPITATIONS: 0
MYALGIAS: 0
CHILLS: 0
DIZZINESS: 0
NERVOUS/ANXIOUS: 0
JOINT SWELLING: 0

## 2023-07-24 ENCOUNTER — OFFICE VISIT (OUTPATIENT)
Dept: FAMILY MEDICINE | Facility: CLINIC | Age: 43
End: 2023-07-24
Payer: COMMERCIAL

## 2023-07-24 VITALS
RESPIRATION RATE: 16 BRPM | TEMPERATURE: 97.4 F | WEIGHT: 145.3 LBS | HEIGHT: 63 IN | DIASTOLIC BLOOD PRESSURE: 86 MMHG | OXYGEN SATURATION: 100 % | HEART RATE: 71 BPM | SYSTOLIC BLOOD PRESSURE: 123 MMHG | BODY MASS INDEX: 25.75 KG/M2

## 2023-07-24 DIAGNOSIS — Z12.11 SCREEN FOR COLON CANCER: ICD-10-CM

## 2023-07-24 DIAGNOSIS — Z13.1 SCREENING FOR DIABETES MELLITUS (DM): ICD-10-CM

## 2023-07-24 DIAGNOSIS — Z13.6 CARDIOVASCULAR SCREENING; LDL GOAL LESS THAN 160: ICD-10-CM

## 2023-07-24 DIAGNOSIS — Z23 NEED FOR DIPHTHERIA-TETANUS-PERTUSSIS (TDAP) VACCINE: ICD-10-CM

## 2023-07-24 DIAGNOSIS — Z13.29 SCREENING FOR THYROID DISORDER: ICD-10-CM

## 2023-07-24 DIAGNOSIS — Z00.00 ROUTINE GENERAL MEDICAL EXAMINATION AT A HEALTH CARE FACILITY: Primary | ICD-10-CM

## 2023-07-24 DIAGNOSIS — Z12.31 ENCOUNTER FOR SCREENING MAMMOGRAM FOR BREAST CANCER: ICD-10-CM

## 2023-07-24 DIAGNOSIS — Z13.0 SCREENING FOR DISORDER OF BLOOD AND BLOOD-FORMING ORGANS: ICD-10-CM

## 2023-07-24 PROCEDURE — 90471 IMMUNIZATION ADMIN: CPT | Performed by: NURSE PRACTITIONER

## 2023-07-24 PROCEDURE — 99396 PREV VISIT EST AGE 40-64: CPT | Mod: 25 | Performed by: NURSE PRACTITIONER

## 2023-07-24 PROCEDURE — 90715 TDAP VACCINE 7 YRS/> IM: CPT | Performed by: NURSE PRACTITIONER

## 2023-07-24 ASSESSMENT — ENCOUNTER SYMPTOMS
FEVER: 0
EYE PAIN: 0
PARESTHESIAS: 0
HEADACHES: 0
PALPITATIONS: 0
NERVOUS/ANXIOUS: 0
COUGH: 0
SHORTNESS OF BREATH: 0
DIARRHEA: 0
JOINT SWELLING: 0
SORE THROAT: 0
FREQUENCY: 0
WEAKNESS: 0
ABDOMINAL PAIN: 0
CONSTIPATION: 0
BREAST MASS: 0
HEMATURIA: 0
DIZZINESS: 0
CHILLS: 0
MYALGIAS: 0
HEMATOCHEZIA: 0
ARTHRALGIAS: 0
NAUSEA: 0
HEARTBURN: 0
DYSURIA: 0

## 2023-07-24 ASSESSMENT — PAIN SCALES - GENERAL: PAINLEVEL: NO PAIN (0)

## 2023-07-24 NOTE — NURSING NOTE
Prior to immunization administration, verified patients identity using patient s name and date of birth. Please see Immunization Activity for additional information.     Screening Questionnaire for Adult Immunization    Are you sick today?   No   Do you have allergies to medications, food, a vaccine component or latex?   No   Have you ever had a serious reaction after receiving a vaccination?   No   Do you have a long-term health problem with heart, lung, kidney, or metabolic disease (e.g., diabetes), asthma, a blood disorder, no spleen, complement component deficiency, a cochlear implant, or a spinal fluid leak?  Are you on long-term aspirin therapy?   No   Do you have cancer, leukemia, HIV/AIDS, or any other immune system problem?   No   Do you have a parent, brother, or sister with an immune system problem?   No   In the past 3 months, have you taken medications that affect  your immune system, such as prednisone, other steroids, or anticancer drugs; drugs for the treatment of rheumatoid arthritis, Crohn s disease, or psoriasis; or have you had radiation treatments?   No   Have you had a seizure, or a brain or other nervous system problem?   No   During the past year, have you received a transfusion of blood or blood    products, or been given immune (gamma) globulin or antiviral drug?   No   For women: Are you pregnant or is there a chance you could become       pregnant during the next month?   No   Have you received any vaccinations in the past 4 weeks?   No     Immunization questionnaire answers were all negative.      Patient instructed to remain in clinic for 15 minutes afterwards, and to report any adverse reactions.     Screening performed by Mary Hill MA on 7/24/2023 at 2:37 PM.

## 2023-07-24 NOTE — PATIENT INSTRUCTIONS
PLAN:   1.   Symptomatic therapy suggested: Increase calcium to 1000mg and 1000iu Vit D .  Try melatonin 4 mg at bedtime as needed   2.  Orders Placed This Encounter   Procedures    REVIEW OF HEALTH MAINTENANCE PROTOCOL ORDERS    *MA Screening Digital Bilateral    TDAP 10-64Y (ADACEL,BOOSTRIX)    Lipid panel reflex to direct LDL Fasting    CBC with platelets    Comprehensive metabolic panel    TSH with free T4 reflex    Colonoscopy Screening  Referral     3.  FUTURE LABS:       - Schedule a fasting blood draw   Will follow up and/or notify patient of  results via My Chart to determine further need for followup  Follow up office visit in one year for annual health maintenance exam, sooner PRN.         Preventive Health Recommendations  Female Ages 40 to 49    Yearly exam:   See your health care provider every year in order to  Review health changes.   Discuss preventive care.    Review your medicines if your doctor prescribed any.    Get a Pap test every three years (unless you have an abnormal result and your provider advises testing more often).    If you get Pap tests with HPV test, you only need to test every 5 years, unless you have an abnormal result. You do not need a Pap test if your uterus was removed (hysterectomy) and you have not had cancer.    You should be tested each year for STDs (sexually transmitted diseases), if you're at risk.   Ask your doctor if you should have a mammogram.    Have a colonoscopy (test for colon cancer) if someone in your family has had colon cancer or polyps before age 50.     Have a cholesterol test every 5 years.     Have a diabetes test (fasting glucose) after age 45. If you are at risk for diabetes, you should have this test every 3 years.    Shots: Get a flu shot each year. Get a tetanus shot every 10 years.     Nutrition:   Eat at least 5 servings of fruits and vegetables each day.  Eat whole-grain bread, whole-wheat pasta and brown rice instead of white grains  and rice.  Get adequate Calcium and Vitamin D.      Lifestyle  Exercise at least 150 minutes a week (an average of 30 minutes a day, 5 days a week). This will help you control your weight and prevent disease.  Limit alcohol to one drink per day.  No smoking.   Wear sunscreen to prevent skin cancer.  See your dentist every six months for an exam and cleaning.

## 2023-07-24 NOTE — PROGRESS NOTES
SUBJECTIVE:   CC: Zulma is an 43 year old who presents for preventive health visit.       7/24/2023     1:41 PM   Additional Questions   Roomed by Mary AMADO   Accompanied by Self         7/24/2023     1:41 PM   Patient Reported Additional Medications   Patient reports taking the following new medications None     Healthy Habits:     Getting at least 3 servings of Calcium per day:  Yes    Bi-annual eye exam:  Yes    Dental care twice a year:  Yes    Sleep apnea or symptoms of sleep apnea:  None    Diet:  Regular (no restrictions)    Frequency of exercise:  2-3 days/week    Duration of exercise:  30-45 minutes    Taking medications regularly:  Yes    Medication side effects:  Not applicable    Additional concerns today:  No      Have you ever done Advance Care Planning? (For example, a Health Directive, POLST, or a discussion with a medical provider or your loved ones about your wishes): No, advance care planning information given to patient to review.  Patient plans to discuss their wishes with loved ones or provider.      Social History     Tobacco Use    Smoking status: Never     Passive exposure: Never    Smokeless tobacco: Never   Substance Use Topics    Alcohol use: Yes     Comment: rarely             7/21/2023    10:19 AM   Alcohol Use   Prescreen: >3 drinks/day or >7 drinks/week? No     Reviewed orders with patient.  Reviewed health maintenance and updated orders accordingly - Yes  Lab work is in process  Labs reviewed in EPIC  BP Readings from Last 3 Encounters:   07/24/23 123/86   02/03/23 (!) 144/88   01/05/23 135/84    Wt Readings from Last 3 Encounters:   07/24/23 65.9 kg (145 lb 4.8 oz)   02/03/23 63.5 kg (140 lb)   11/17/22 63.5 kg (140 lb)                  Patient Active Problem List   Diagnosis    Gastroesophageal reflux disease without esophagitis    ASCUS with positive high risk HPV cervical    Family history of ulcerative colitis    Skin lesion     Past Surgical History:   Procedure Laterality  Date     SECTION      COMBINED ESOPHAGOSCOPY, GASTROSCOPY, DUODENOSCOPY (EGD) WITH CO2 INSUFFLATION N/A 2019    Procedure: COMBINED ESOPHAGOSCOPY, GASTROSCOPY, DUODENOSCOPY (EGD) WITH CO2 INSUFFLATION;  Surgeon: Chacorta Nunez MD;  Location: [facility]    ESOPHAGOSCOPY, GASTROSCOPY, DUODENOSCOPY (EGD), COMBINED N/A 2019    Procedure: Combined Esophagoscopy, Gastroscopy, Duodenoscopy (Egd), Biopsy Single Or Multiple;  Surgeon: Chacorta Nunez MD;  Location: [facility]    EXCISE LESION FACE Left 2022    Procedure: Left lower lip lesion excision, complex closure,;  Surgeon: Hernandez Arriaga MD;  Location: [facility]       Social History     Tobacco Use    Smoking status: Never     Passive exposure: Never    Smokeless tobacco: Never   Substance Use Topics    Alcohol use: Yes     Comment: rarely     Family History   Problem Relation Age of Onset    Arthritis Mother         fibromyalgia    Crohn's Disease Mother     Ulcerative Colitis Mother     Inflammatory Bowel Disease Mother     Diabetes Mother         Type @    Hypertension Mother     Cerebrovascular Disease Maternal Grandfather         n/a    Diabetes Maternal Grandfather     Inflammatory Bowel Disease Daughter     Hypertension Maternal Grandmother          Current Outpatient Medications   Medication Sig Dispense Refill    famotidine (PEPCID) 20 MG tablet TAKE 1 TABLET BY MOUTH TWICE A  tablet 0     Allergies   Allergen Reactions    No Known Drug Allergy        Breast Cancer Screening:    FHS-7:       10/18/2021     8:43 AM 2022     3:17 PM 2023    10:21 AM   Breast CA Risk Assessment (FHS-7)   Did any of your first-degree relatives have breast or ovarian cancer? No No No   Did any of your relatives have bilateral breast cancer? No No Unknown   Did any man in your family have breast cancer? No No No   Did any woman in your family have breast and ovarian cancer? No No Yes   Did any woman in your family have breast  cancer before age 50 y? No No Yes   Do you have 2 or more relatives with breast and/or ovarian cancer? No No No   Do you have 2 or more relatives with breast and/or bowel cancer? No No No       Mammogram Screening: Recommended annual mammography  Pertinent mammograms are reviewed under the imaging tab.    History of abnormal Pap smear: Done by GYN       Latest Ref Rng & Units 2/3/2023     2:05 PM 2020     8:45 AM 2020     8:35 AM   PAP / HPV   PAP  Negative for Intraepithelial Lesion or Malignancy (NILM)      PAP (Historical)    NIL    HPV 16 DNA Negative Negative  Negative     HPV 18 DNA Negative Negative  Negative     Other HR HPV Negative Negative  Negative       Reviewed and updated as needed this visit by clinical staff   Tobacco  Allergies  Meds  Problems             Reviewed and updated as needed this visit by Provider      Problems            Past Medical History:   Diagnosis Date    Human papillomavirus in conditions classified elsewhere and of unspecified site 2000    Meningitis, unspecified(322.9) 1980    Meningitis, viral      Past Surgical History:   Procedure Laterality Date     SECTION      COMBINED ESOPHAGOSCOPY, GASTROSCOPY, DUODENOSCOPY (EGD) WITH CO2 INSUFFLATION N/A 2019    Procedure: COMBINED ESOPHAGOSCOPY, GASTROSCOPY, DUODENOSCOPY (EGD) WITH CO2 INSUFFLATION;  Surgeon: Chacorta Nunez MD;  Location: [facility]    ESOPHAGOSCOPY, GASTROSCOPY, DUODENOSCOPY (EGD), COMBINED N/A 2019    Procedure: Combined Esophagoscopy, Gastroscopy, Duodenoscopy (Egd), Biopsy Single Or Multiple;  Surgeon: Chacorta Nunez MD;  Location: [facility]    EXCISE LESION FACE Left 2022    Procedure: Left lower lip lesion excision, complex closure,;  Surgeon: Hernandez Arriaga MD;  Location: [facility]       Review of Systems   Constitutional:  Negative for chills and fever.   HENT:  Negative for congestion, ear pain, hearing loss and sore throat.    Eyes:   "Negative for pain and visual disturbance.   Respiratory:  Negative for cough and shortness of breath.    Cardiovascular:  Negative for chest pain, palpitations and peripheral edema.   Gastrointestinal:  Negative for abdominal pain, constipation, diarrhea, heartburn, hematochezia and nausea.   Breasts:  Negative for tenderness, breast mass and discharge.   Genitourinary:  Negative for dysuria, frequency, genital sores, hematuria, pelvic pain, urgency, vaginal bleeding and vaginal discharge.   Musculoskeletal:  Negative for arthralgias, joint swelling and myalgias.   Skin:  Negative for rash.   Neurological:  Negative for dizziness, weakness, headaches and paresthesias.   Psychiatric/Behavioral:  Negative for mood changes. The patient is not nervous/anxious.         OBJECTIVE:   /86 (BP Location: Right arm, Patient Position: Sitting, Cuff Size: Adult Regular)   Pulse 71   Temp 97.4  F (36.3  C) (Oral)   Resp 16   Ht 1.605 m (5' 3.19\")   Wt 65.9 kg (145 lb 4.8 oz)   LMP 07/15/2023 (Exact Date)   SpO2 100%   BMI 25.58 kg/m    Physical Exam  GENERAL: healthy, alert and no distress  EYES: Eyes grossly normal to inspection and conjunctivae and sclerae normal  HENT: ear canals and TM's normal, nose and mouth without ulcers or lesions  NECK: no adenopathy, no asymmetry, masses, or scars and thyroid normal to palpation  RESP: lungs clear to auscultation - no rales, rhonchi or wheezes  BREAST: normal without masses, tenderness or nipple discharge and no palpable axillary masses or adenopathy  CV: regular rates and rhythm, no murmur, click or rub, peripheral pulses strong, and no peripheral edema  ABDOMEN: soft, nontender, no hepatosplenomegaly, no masses and bowel sounds normal   (female): deferred  MS: no gross musculoskeletal defects noted, no edema  SKIN: no suspicious lesions or rashes  NEURO: Normal strength and tone, mentation intact and speech normal  PSYCH: mentation appears normal, affect " "normal/bright  LYMPH: no cervical, supraclavicular, axillary, or inguinal adenopathy    Diagnostic Test Results:  Labs reviewed in Epic  Pending orders and results       ASSESSMENT/PLAN:   Zulma was seen today for physical.    Diagnoses and all orders for this visit:    Routine general medical examination at a health care facility  -     REVIEW OF HEALTH MAINTENANCE PROTOCOL ORDERS    CARDIOVASCULAR SCREENING; LDL GOAL LESS THAN 160  -     Lipid panel reflex to direct LDL Fasting; Future    Screening for diabetes mellitus (DM)  -     Comprehensive metabolic panel; Future    Screening for disorder of blood and blood-forming organs  -     CBC with platelets; Future    Screening for thyroid disorder  -     TSH with free T4 reflex; Future    Screen for colon cancer  -     Colonoscopy Screening  Referral; Future    Encounter for screening mammogram for breast cancer  -     *MA Screening Digital Bilateral; Future    Need for diphtheria-tetanus-pertussis (Tdap) vaccine  -     TDAP 10-64Y (ADACEL,BOOSTRIX)      PLAN:   FUTURE LABS:       - Schedule a fasting blood draw   Will follow up and/or notify patient of  results via My Chart to determine further need for followup  Follow up office visit in one year for annual health maintenance exam, sooner PRN.     Patient has been advised of split billing requirements and indicates understanding: Yes      COUNSELING:  Reviewed preventive health counseling, as reflected in patient instructions  Special attention given to:        Regular exercise       Healthy diet/nutrition       Vision screening       Osteoporosis prevention/bone health       Colorectal Cancer Screening      BMI:   Estimated body mass index is 25.58 kg/m  as calculated from the following:    Height as of this encounter: 1.605 m (5' 3.19\").    Weight as of this encounter: 65.9 kg (145 lb 4.8 oz).   Weight management plan: Discussed healthy diet and exercise guidelines      She reports that she has never " smoked. She has never been exposed to tobacco smoke. She has never used smokeless tobacco.          ROMA Gallagher CNP  [facility]

## 2023-07-28 ENCOUNTER — LAB (OUTPATIENT)
Dept: LAB | Facility: CLINIC | Age: 43
End: 2023-07-28
Payer: COMMERCIAL

## 2023-07-28 DIAGNOSIS — Z13.1 SCREENING FOR DIABETES MELLITUS (DM): ICD-10-CM

## 2023-07-28 DIAGNOSIS — Z13.0 SCREENING FOR DISORDER OF BLOOD AND BLOOD-FORMING ORGANS: ICD-10-CM

## 2023-07-28 DIAGNOSIS — Z13.29 SCREENING FOR THYROID DISORDER: ICD-10-CM

## 2023-07-28 DIAGNOSIS — Z13.6 CARDIOVASCULAR SCREENING; LDL GOAL LESS THAN 160: ICD-10-CM

## 2023-07-28 LAB
ALBUMIN SERPL BCG-MCNC: 4.2 G/DL (ref 3.5–5.2)
ALP SERPL-CCNC: 62 U/L (ref 35–104)
ALT SERPL W P-5'-P-CCNC: 6 U/L (ref 0–50)
ANION GAP SERPL CALCULATED.3IONS-SCNC: 9 MMOL/L (ref 7–15)
AST SERPL W P-5'-P-CCNC: 21 U/L (ref 0–45)
BILIRUB SERPL-MCNC: 0.4 MG/DL
BUN SERPL-MCNC: 13.3 MG/DL (ref 6–20)
CALCIUM SERPL-MCNC: 9.2 MG/DL (ref 8.6–10)
CHLORIDE SERPL-SCNC: 104 MMOL/L (ref 98–107)
CHOLEST SERPL-MCNC: 174 MG/DL
CREAT SERPL-MCNC: 0.91 MG/DL (ref 0.51–0.95)
DEPRECATED HCO3 PLAS-SCNC: 26 MMOL/L (ref 22–29)
ERYTHROCYTE [DISTWIDTH] IN BLOOD BY AUTOMATED COUNT: 12.5 % (ref 10–15)
GFR SERPL CREATININE-BSD FRML MDRD: 79 ML/MIN/1.73M2
GLUCOSE SERPL-MCNC: 96 MG/DL (ref 70–99)
HCT VFR BLD AUTO: 40.5 % (ref 35–47)
HDLC SERPL-MCNC: 66 MG/DL
HGB BLD-MCNC: 13.1 G/DL (ref 11.7–15.7)
LDLC SERPL CALC-MCNC: 95 MG/DL
MCH RBC QN AUTO: 28.9 PG (ref 26.5–33)
MCHC RBC AUTO-ENTMCNC: 32.3 G/DL (ref 31.5–36.5)
MCV RBC AUTO: 89 FL (ref 78–100)
NONHDLC SERPL-MCNC: 108 MG/DL
PLATELET # BLD AUTO: 350 10E3/UL (ref 150–450)
POTASSIUM SERPL-SCNC: 4.3 MMOL/L (ref 3.4–5.3)
PROT SERPL-MCNC: 7 G/DL (ref 6.4–8.3)
RBC # BLD AUTO: 4.53 10E6/UL (ref 3.8–5.2)
SODIUM SERPL-SCNC: 139 MMOL/L (ref 136–145)
TRIGL SERPL-MCNC: 64 MG/DL
TSH SERPL DL<=0.005 MIU/L-ACNC: 1.98 UIU/ML (ref 0.3–4.2)
WBC # BLD AUTO: 6.1 10E3/UL (ref 4–11)

## 2023-07-28 PROCEDURE — 80061 LIPID PANEL: CPT

## 2023-07-28 PROCEDURE — 84443 ASSAY THYROID STIM HORMONE: CPT

## 2023-07-28 PROCEDURE — 36415 COLL VENOUS BLD VENIPUNCTURE: CPT

## 2023-07-28 PROCEDURE — 85027 COMPLETE CBC AUTOMATED: CPT

## 2023-07-28 PROCEDURE — 80053 COMPREHEN METABOLIC PANEL: CPT

## 2023-07-31 NOTE — RESULT ENCOUNTER NOTE
Ricardo Coronado,    Attached are your test results.  -Normal red blood cell (hgb) levels, normal white blood cell count and normal platelet levels.  -Cholesterol levels (LDL,HDL, Triglycerides) are okay.  ADVISE: rechecking  in 1 year.  -Liver and gallbladder tests are normal (ALT,AST, Alk phos, bilirubin), kidney function is normal (Cr, GFR), sodium is normal, potassium is normal, calcium is normal, glucose is normal.  -TSH (thyroid stimulating hormone) level is normal which indicates normal thyroid function.   Please contact us if you have any questions.    Samanta Narvaez, CNP

## 2023-10-01 DIAGNOSIS — K21.9 GASTROESOPHAGEAL REFLUX DISEASE WITHOUT ESOPHAGITIS: ICD-10-CM

## 2023-10-02 ENCOUNTER — PATIENT OUTREACH (OUTPATIENT)
Dept: CARE COORDINATION | Facility: CLINIC | Age: 43
End: 2023-10-02
Payer: COMMERCIAL

## 2023-10-02 RX ORDER — FAMOTIDINE 20 MG/1
TABLET, FILM COATED ORAL
Qty: 180 TABLET | Refills: 2 | Status: SHIPPED | OUTPATIENT
Start: 2023-10-02 | End: 2024-07-04

## 2023-10-30 ENCOUNTER — PATIENT OUTREACH (OUTPATIENT)
Dept: CARE COORDINATION | Facility: CLINIC | Age: 43
End: 2023-10-30
Payer: COMMERCIAL

## 2023-11-03 ENCOUNTER — ANCILLARY PROCEDURE (OUTPATIENT)
Dept: MAMMOGRAPHY | Facility: CLINIC | Age: 43
End: 2023-11-03
Attending: NURSE PRACTITIONER
Payer: COMMERCIAL

## 2023-11-03 DIAGNOSIS — Z12.31 VISIT FOR SCREENING MAMMOGRAM: ICD-10-CM

## 2023-11-03 PROCEDURE — 77067 SCR MAMMO BI INCL CAD: CPT | Mod: GC

## 2023-11-03 PROCEDURE — 77063 BREAST TOMOSYNTHESIS BI: CPT | Mod: GC

## 2023-11-13 ENCOUNTER — VIRTUAL VISIT (OUTPATIENT)
Dept: FAMILY MEDICINE | Facility: CLINIC | Age: 43
End: 2023-11-13
Payer: COMMERCIAL

## 2023-11-13 DIAGNOSIS — K12.30 STOMATITIS AND MUCOSITIS: Primary | ICD-10-CM

## 2023-11-13 DIAGNOSIS — K12.1 STOMATITIS AND MUCOSITIS: Primary | ICD-10-CM

## 2023-11-13 PROCEDURE — 99213 OFFICE O/P EST LOW 20 MIN: CPT | Mod: VID | Performed by: NURSE PRACTITIONER

## 2023-11-13 NOTE — PATIENT INSTRUCTIONS
PLAN:   1.   Symptomatic therapy suggested: will try a week of Magic Mouthwash   2.  Orders Placed This Encounter   Medications    magic mouthwash (ENTER INGREDIENTS IN COMMENTS) suspension     Sig: Take 5 mLs by mouth every 6 hours as needed Maalox, Nystatin and benadryl     Dispense:  150 mL     Refill:  0     3.  Follow up in 10 days if not improving.  Then will refer to ENT   Patient needs to follow up in if no improvement,or sooner if worsening of symptoms or other symptoms develop.

## 2023-11-13 NOTE — PROGRESS NOTES
"  Instructions Relayed to Patient by Virtual Roomer:     Patient is active on [employer]:   Relayed following to patient: \"It looks like you are active on [employer], are you able to join the visit this way? If not, do you need us to send you a link now or would you like your provider to send a link via text or email when they are ready to initiate the visit?\"    Reminded patient to ensure they were logged on to virtual visit by arrival time listed. Documented in appointment notes if patient had flexibility to initiate visit sooner than arrival time. If pediatric virtual visit, ensured pediatric patient along with parent/guardian will be present for video visit.     Patient offered the website [email or web address] and/or phone number to [employer] Help line: [phone]   Zulma is a 43 year old who is being evaluated via a billable video visit.      How would you like to obtain your AVS? [employer]  If the video visit is dropped, the invitation should be resent by: Text to cell phone: [phone]  Will anyone else be joining your video visit? No        Subjective   Zulma is a 43 year old, presenting for the following health issues:  Tongue Lesion(S)        11/13/2023    12:41 PM   Additional Questions   Roomed by gadiel   Accompanied by self       History of Present Illness       Reason for visit:  Red patch on tongue  Symptom onset:  1-2 weeks ago  Symptoms include:  Red patch on tongue. Slightly burns  Symptom intensity:  Mild  Symptom progression:  Staying the same  Had these symptoms before:  No  What makes it worse:  Feels worse at end of day  What makes it better:  Salt water rinses    She eats 2-3 servings of fruits and vegetables daily.She consumes 1 sweetened beverage(s) daily.She exercises with enough effort to increase her heart rate 30 to 60 minutes per day.  She exercises with enough effort to increase her heart rate 3 or less days per week.   She is taking medications regularly.  Pt says it feels " like its irritated like a burning feeling, raw feeling more from exposure to something  Has noticed this area on her tongue   Does have almost a metallically like sensation   Noticed it 2 weeks ago and then got better           Lab work is in process  Labs reviewed in EPIC  BP Readings from Last 3 Encounters:   23 123/86   23 (!) 144/88   23 135/84    Wt Readings from Last 3 Encounters:   23 65.9 kg (145 lb 4.8 oz)   23 63.5 kg (140 lb)   22 63.5 kg (140 lb)                  Patient Active Problem List   Diagnosis    Gastroesophageal reflux disease without esophagitis    ASCUS with positive high risk HPV cervical    Family history of ulcerative colitis    Skin lesion     Past Surgical History:   Procedure Laterality Date     SECTION      COMBINED ESOPHAGOSCOPY, GASTROSCOPY, DUODENOSCOPY (EGD) WITH CO2 INSUFFLATION N/A 2019    Procedure: COMBINED ESOPHAGOSCOPY, GASTROSCOPY, DUODENOSCOPY (EGD) WITH CO2 INSUFFLATION;  Surgeon: Chacorta Nunez MD;  Location: [facility]    ESOPHAGOSCOPY, GASTROSCOPY, DUODENOSCOPY (EGD), COMBINED N/A 2019    Procedure: Combined Esophagoscopy, Gastroscopy, Duodenoscopy (Egd), Biopsy Single Or Multiple;  Surgeon: Chacorta Nunez MD;  Location: [facility]    EXCISE LESION FACE Left 2022    Procedure: Left lower lip lesion excision, complex closure,;  Surgeon: Hernandez Arriaga MD;  Location: [facility]       Social History     Tobacco Use    Smoking status: Never     Passive exposure: Never    Smokeless tobacco: Never   Substance Use Topics    Alcohol use: Yes     Comment: rarely     Family History   Problem Relation Age of Onset    Arthritis Mother         fibromyalgia    Crohn's Disease Mother     Ulcerative Colitis Mother     Inflammatory Bowel Disease Mother     Diabetes Mother         Type @    Hypertension Mother     Cerebrovascular Disease Maternal Grandfather         n/a    Diabetes Maternal Grandfather      Inflammatory Bowel Disease Daughter     Hypertension Maternal Grandmother          Current Outpatient Medications   Medication Sig Dispense Refill    famotidine (PEPCID) 20 MG tablet TAKE 1 TABLET BY MOUTH TWICE A  tablet 2     Allergies   Allergen Reactions    No Known Drug Allergy            Review of Systems   Constitutional, HEENT, cardiovascular, pulmonary, gi and gu systems are negative, except as otherwise noted.      Objective           Vitals:  No vitals were obtained today due to virtual visit.    Physical Exam   GENERAL: Healthy, alert and no distress  EYES: Eyes grossly normal to inspection and conjunctivae and sclerae normal  HENT: normal cephalic/atraumatic, oral mucous membranes moist, and red patch noted on tongue which seems sensitive   RESP: No audible wheeze, cough, or visible cyanosis.  No visible retractions or increased work of breathing.    SKIN: Visible skin clear. No significant rash, abnormal pigmentation or lesions.  NEURO: Normal strength and tone  PSYCH: Mentation appears normal, affect normal/bright, judgement and insight intact, normal speech and appearance well-groomed.    Assessment & Plan     Stomatitis and mucositis  Will Start:  - magic mouthwash (ENTER INGREDIENTS IN COMMENTS) suspension  Dispense: 150 mL; Refill: 0      Prescription drug management   Time spent by me doing chart review, history and exam, documentation and further activities per the note       See Patient Instructions  Patient Instructions   PLAN:   1.   Symptomatic therapy suggested: will try a week of Magic Mouthwash   2.  Orders Placed This Encounter   Medications    magic mouthwash (ENTER INGREDIENTS IN COMMENTS) suspension     Sig: Take 5 mLs by mouth every 6 hours as needed Maalox, Nystatin and benadryl     Dispense:  150 mL     Refill:  0     3.  Follow up in 10 days if not improving.  Then will refer to ENT   Patient needs to follow up in if no improvement,or sooner if worsening of symptoms or  other symptoms develop.          ROMA Gallagher [facility]          Video-Visit Details    Type of service:  Video Visit     Originating Location (pt. Location): Home    Distant Location (provider location):  On-site  Platform used for Video Visit: Ailin

## 2023-11-15 ENCOUNTER — MYC MEDICAL ADVICE (OUTPATIENT)
Dept: FAMILY MEDICINE | Facility: CLINIC | Age: 43
End: 2023-11-15
Payer: COMMERCIAL

## 2023-11-15 DIAGNOSIS — K12.30 STOMATITIS AND MUCOSITIS: ICD-10-CM

## 2023-11-15 DIAGNOSIS — K12.1 STOMATITIS AND MUCOSITIS: ICD-10-CM

## 2024-02-29 ENCOUNTER — TRANSFERRED RECORDS (OUTPATIENT)
Dept: HEALTH INFORMATION MANAGEMENT | Facility: CLINIC | Age: 44
End: 2024-02-29
Payer: COMMERCIAL

## 2024-07-04 DIAGNOSIS — K21.9 GASTROESOPHAGEAL REFLUX DISEASE WITHOUT ESOPHAGITIS: ICD-10-CM

## 2024-07-04 RX ORDER — FAMOTIDINE 20 MG/1
TABLET, FILM COATED ORAL
Qty: 180 TABLET | Refills: 0 | Status: SHIPPED | OUTPATIENT
Start: 2024-07-04 | End: 2024-08-01

## 2024-07-31 SDOH — HEALTH STABILITY: PHYSICAL HEALTH: ON AVERAGE, HOW MANY MINUTES DO YOU ENGAGE IN EXERCISE AT THIS LEVEL?: 60 MIN

## 2024-07-31 SDOH — HEALTH STABILITY: PHYSICAL HEALTH: ON AVERAGE, HOW MANY DAYS PER WEEK DO YOU ENGAGE IN MODERATE TO STRENUOUS EXERCISE (LIKE A BRISK WALK)?: 4 DAYS

## 2024-07-31 ASSESSMENT — SOCIAL DETERMINANTS OF HEALTH (SDOH): HOW OFTEN DO YOU GET TOGETHER WITH FRIENDS OR RELATIVES?: ONCE A WEEK

## 2024-08-01 ENCOUNTER — OFFICE VISIT (OUTPATIENT)
Dept: FAMILY MEDICINE | Facility: CLINIC | Age: 44
End: 2024-08-01
Attending: NURSE PRACTITIONER
Payer: COMMERCIAL

## 2024-08-01 VITALS
RESPIRATION RATE: 16 BRPM | WEIGHT: 153.44 LBS | DIASTOLIC BLOOD PRESSURE: 83 MMHG | OXYGEN SATURATION: 100 % | HEIGHT: 63 IN | BODY MASS INDEX: 27.19 KG/M2 | SYSTOLIC BLOOD PRESSURE: 130 MMHG | TEMPERATURE: 97.9 F | HEART RATE: 80 BPM

## 2024-08-01 DIAGNOSIS — Z00.00 ROUTINE GENERAL MEDICAL EXAMINATION AT A HEALTH CARE FACILITY: Primary | ICD-10-CM

## 2024-08-01 DIAGNOSIS — N92.0 MENORRHAGIA WITH REGULAR CYCLE: ICD-10-CM

## 2024-08-01 DIAGNOSIS — Z13.0 SCREENING FOR DISORDER OF BLOOD AND BLOOD-FORMING ORGANS: ICD-10-CM

## 2024-08-01 DIAGNOSIS — K21.9 GASTROESOPHAGEAL REFLUX DISEASE WITHOUT ESOPHAGITIS: ICD-10-CM

## 2024-08-01 DIAGNOSIS — R06.83 SNORING: ICD-10-CM

## 2024-08-01 DIAGNOSIS — G47.00 INSOMNIA, UNSPECIFIED TYPE: ICD-10-CM

## 2024-08-01 DIAGNOSIS — Z13.29 SCREENING FOR THYROID DISORDER: ICD-10-CM

## 2024-08-01 DIAGNOSIS — Z13.1 SCREENING FOR DIABETES MELLITUS (DM): ICD-10-CM

## 2024-08-01 DIAGNOSIS — R93.89 ABNORMAL PELVIC ULTRASOUND: ICD-10-CM

## 2024-08-01 DIAGNOSIS — Z13.6 CARDIOVASCULAR SCREENING; LDL GOAL LESS THAN 160: ICD-10-CM

## 2024-08-01 LAB
ALBUMIN SERPL BCG-MCNC: 4.2 G/DL (ref 3.5–5.2)
ALP SERPL-CCNC: 67 U/L (ref 40–150)
ALT SERPL W P-5'-P-CCNC: 12 U/L (ref 0–50)
ANION GAP SERPL CALCULATED.3IONS-SCNC: 8 MMOL/L (ref 7–15)
AST SERPL W P-5'-P-CCNC: 19 U/L (ref 0–45)
BILIRUB SERPL-MCNC: 0.4 MG/DL
BUN SERPL-MCNC: 15.1 MG/DL (ref 6–20)
CALCIUM SERPL-MCNC: 9.1 MG/DL (ref 8.8–10.4)
CHLORIDE SERPL-SCNC: 108 MMOL/L (ref 98–107)
CHOLEST SERPL-MCNC: 174 MG/DL
CREAT SERPL-MCNC: 0.99 MG/DL (ref 0.51–0.95)
EGFRCR SERPLBLD CKD-EPI 2021: 71 ML/MIN/1.73M2
ERYTHROCYTE [DISTWIDTH] IN BLOOD BY AUTOMATED COUNT: 13.3 % (ref 10–15)
FASTING STATUS PATIENT QL REPORTED: YES
FASTING STATUS PATIENT QL REPORTED: YES
FERRITIN SERPL-MCNC: 8 NG/ML (ref 6–175)
GLUCOSE SERPL-MCNC: 95 MG/DL (ref 70–99)
HCO3 SERPL-SCNC: 27 MMOL/L (ref 22–29)
HCT VFR BLD AUTO: 36.5 % (ref 35–47)
HDLC SERPL-MCNC: 57 MG/DL
HGB BLD-MCNC: 11.5 G/DL (ref 11.7–15.7)
IRON BINDING CAPACITY (ROCHE): 342 UG/DL (ref 240–430)
IRON SATN MFR SERPL: 16 % (ref 15–46)
IRON SERPL-MCNC: 56 UG/DL (ref 37–145)
LDLC SERPL CALC-MCNC: 104 MG/DL
MCH RBC QN AUTO: 25.6 PG (ref 26.5–33)
MCHC RBC AUTO-ENTMCNC: 31.5 G/DL (ref 31.5–36.5)
MCV RBC AUTO: 81 FL (ref 78–100)
NONHDLC SERPL-MCNC: 117 MG/DL
PLATELET # BLD AUTO: 371 10E3/UL (ref 150–450)
POTASSIUM SERPL-SCNC: 4.8 MMOL/L (ref 3.4–5.3)
PROT SERPL-MCNC: 7.1 G/DL (ref 6.4–8.3)
RBC # BLD AUTO: 4.49 10E6/UL (ref 3.8–5.2)
SODIUM SERPL-SCNC: 143 MMOL/L (ref 135–145)
TRIGL SERPL-MCNC: 64 MG/DL
TSH SERPL DL<=0.005 MIU/L-ACNC: 2.3 UIU/ML (ref 0.3–4.2)
WBC # BLD AUTO: 5.7 10E3/UL (ref 4–11)

## 2024-08-01 PROCEDURE — 83550 IRON BINDING TEST: CPT | Performed by: NURSE PRACTITIONER

## 2024-08-01 PROCEDURE — 82728 ASSAY OF FERRITIN: CPT | Performed by: NURSE PRACTITIONER

## 2024-08-01 PROCEDURE — 80053 COMPREHEN METABOLIC PANEL: CPT | Performed by: NURSE PRACTITIONER

## 2024-08-01 PROCEDURE — 85027 COMPLETE CBC AUTOMATED: CPT | Performed by: NURSE PRACTITIONER

## 2024-08-01 PROCEDURE — 99213 OFFICE O/P EST LOW 20 MIN: CPT | Mod: 25 | Performed by: NURSE PRACTITIONER

## 2024-08-01 PROCEDURE — 84443 ASSAY THYROID STIM HORMONE: CPT | Performed by: NURSE PRACTITIONER

## 2024-08-01 PROCEDURE — 83540 ASSAY OF IRON: CPT | Performed by: NURSE PRACTITIONER

## 2024-08-01 PROCEDURE — 36415 COLL VENOUS BLD VENIPUNCTURE: CPT | Performed by: NURSE PRACTITIONER

## 2024-08-01 PROCEDURE — 80061 LIPID PANEL: CPT | Performed by: NURSE PRACTITIONER

## 2024-08-01 PROCEDURE — 99396 PREV VISIT EST AGE 40-64: CPT | Performed by: NURSE PRACTITIONER

## 2024-08-01 RX ORDER — FAMOTIDINE 20 MG/1
20 TABLET, FILM COATED ORAL 2 TIMES DAILY
Qty: 180 TABLET | Refills: 3 | Status: SHIPPED | OUTPATIENT
Start: 2024-08-01

## 2024-08-01 ASSESSMENT — PAIN SCALES - GENERAL: PAINLEVEL: NO PAIN (0)

## 2024-08-01 NOTE — PROGRESS NOTES
"Preventive Care Visit  [facility] JOSUÉ England ROMA Martinez CNP, Family Medicine  Aug 1, 2024      Assessment & Plan     Routine general medical examination at a health care facility    CARDIOVASCULAR SCREENING; LDL GOAL LESS THAN 160  - Lipid panel reflex to direct LDL Fasting    Screening for diabetes mellitus (DM)  - Comprehensive metabolic panel    Screening for disorder of blood and blood-forming organs  - CBC with platelets  - Ferritin  - Iron & Iron Binding Capacity    Screening for thyroid disorder  - TSH with free T4 reflex    Menorrhagia with regular cycle  Will follow up and/or notify patient of  results via My Chart to determine further need for followup   - US Pelvic Transabdominal and Transvaginal    Insomnia, unspecified type  Referral ordered follow up as indicated with specialist   - Adult Sleep Eval & Management  Referral    Snoring  Referral ordered follow up as indicated with specialist   - Adult Sleep Eval & Management  Referral    Gastroesophageal reflux disease without esophagitis  Discussed that these symptoms is likely related to reflux or GERD. Discussed non-pharmacologic treatment, including elevating the head of bed, decrease food before bedtime and decreased caffeine and nicotine and alcohol.  Went over meds for reflux. Pt will continue Pepcid. Recheck if not improving as expected.  - famotidine (PEPCID) 20 MG tablet  Dispense: 180 tablet; Refill: 3      Patient has been advised of split billing requirements and indicates understanding: Yes        BMI  Estimated body mass index is 27.18 kg/m  as calculated from the following:    Height as of this encounter: 1.6 m (5' 3\").    Weight as of this encounter: 69.6 kg (153 lb 7 oz).   Weight management plan: Discussed healthy diet and exercise guidelines    Counseling  Appropriate preventive services were addressed with this patient via screening, questionnaire, or discussion as appropriate for fall " prevention, nutrition, physical activity, Tobacco-use cessation, weight loss and cognition.  Checklist reviewing preventive services available has been given to the patient.  Reviewed patient's diet, addressing concerns and/or questions.       FUTURE APPOINTMENTS:       - Follow-up for annual visit or as needed  See Patient Instructions    Derrell Maya is a 44 year old, presenting for the following:  Physical        8/1/2024     7:03 AM   Additional Questions   Roomed by Diane Lynne Schoenherr RN        Health Care Directive  Patient does not have a Health Care Directive or Living Will: Discussed advance care planning with patient; however, patient declined at this time.    HPI        7/31/2024   General Health   How would you rate your overall physical health? Good   Feel stress (tense, anxious, or unable to sleep) To some extent      (!) STRESS CONCERN      7/31/2024   Nutrition   Three or more servings of calcium each day? Yes   Diet: Regular (no restrictions)   How many servings of fruit and vegetables per day? (!) 2-3   How many sweetened beverages each day? 0-1            7/31/2024   Exercise   Days per week of moderate/strenous exercise 4 days   Average minutes spent exercising at this level 60 min            7/31/2024   Social Factors   Frequency of gathering with friends or relatives Once a week   Worry food won't last until get money to buy more No   Food not last or not have enough money for food? No   Do you have housing? (Housing is defined as stable permanent housing and does not include staying ouside in a car, in a tent, in an abandoned building, in an overnight shelter, or couch-surfing.) Yes   Are you worried about losing your housing? No   Lack of transportation? No   Unable to get utilities (heat,electricity)? No            7/31/2024   Dental   Dentist two times every year? Yes            7/31/2024   TB Screening   Were you born outside of the [place]? No            Today's PHQ-2 Score:        7/31/2024    10:13 AM   PHQ-2 ( 1999 [employer])   Q1: Little interest or pleasure in doing things 0   Q2: Feeling down, depressed or hopeless 0   PHQ-2 Score 0   Q1: Little interest or pleasure in doing things Not at all   Q2: Feeling down, depressed or hopeless Not at all   PHQ-2 Score 0           7/31/2024   Substance Use   Alcohol more than 3/day or more than 7/wk No   Do you use any other substances recreationally? No        Social History     Tobacco Use    Smoking status: Never     Passive exposure: Never    Smokeless tobacco: Never   Vaping Use    Vaping status: Never Used   Substance Use Topics    Alcohol use: Yes     Comment: rarely    Drug use: No           11/3/2023   LAST FHS-7 RESULTS   1st degree relative breast or ovarian cancer No   Any relative bilateral breast cancer Unknown   Any male have breast cancer No   Any ONE woman have BOTH breast AND ovarian cancer No   Any woman with breast cancer before 50yrs No   2 or more relatives with breast AND/OR ovarian cancer Yes   2 or more relatives with breast AND/OR bowel cancer No           Mammogram Screening - Mammogram every 1-2 years updated in Health Maintenance based on mutual decision making        7/31/2024   STI Screening   New sexual partner(s) since last STI/HIV test? No        History of abnormal Pap smear: No - age 30-64 HPV with reflex Pap every 5 years recommended        Latest Ref Rng & Units 2/3/2023     2:05 PM 12/21/2020     8:45 AM 12/21/2020     8:35 AM   PAP / HPV   PAP  Negative for Intraepithelial Lesion or Malignancy (NILM)      PAP (Historical)    NIL    HPV 16 DNA Negative Negative  Negative     HPV 18 DNA Negative Negative  Negative     Other HR HPV Negative Negative  Negative       ASCVD Risk   The 10-year ASCVD risk score (Kishore ADKINS, et al., 2019) is: 0.5%    Values used to calculate the score:      Age: 44 years      Sex: Female      Is Non- : No      Diabetic: No      Tobacco smoker: No       Systolic Blood Pressure: 130 mmHg      Is BP treated: No      HDL Cholesterol: 66 mg/dL      Total Cholesterol: 174 mg/dL        2024   Contraception/Family Planning   Questions about contraception or family planning No           Reviewed and updated as needed this visit by Provider                    Past Medical History:   Diagnosis Date    Human papillomavirus in conditions classified elsewhere and of unspecified site 2000    Meningitis, unspecified(322.9) 1980    Meningitis, viral     Past Surgical History:   Procedure Laterality Date     SECTION      COMBINED ESOPHAGOSCOPY, GASTROSCOPY, DUODENOSCOPY (EGD) WITH CO2 INSUFFLATION N/A 2019    Procedure: COMBINED ESOPHAGOSCOPY, GASTROSCOPY, DUODENOSCOPY (EGD) WITH CO2 INSUFFLATION;  Surgeon: Chacorta Nunez MD;  Location: [facility]    ESOPHAGOSCOPY, GASTROSCOPY, DUODENOSCOPY (EGD), COMBINED N/A 2019    Procedure: Combined Esophagoscopy, Gastroscopy, Duodenoscopy (Egd), Biopsy Single Or Multiple;  Surgeon: Chacorta Nunez MD;  Location: [facility]    EXCISE LESION FACE Left 2022    Procedure: Left lower lip lesion excision, complex closure,;  Surgeon: Hernandez Arriaga MD;  Location: [facility]     Lab work is in process  Labs reviewed in EPIC  BP Readings from Last 3 Encounters:   24 130/83   23 123/86   23 (!) 144/88    Wt Readings from Last 3 Encounters:   24 69.6 kg (153 lb 7 oz)   23 65.9 kg (145 lb 4.8 oz)   23 63.5 kg (140 lb)                  Patient Active Problem List   Diagnosis    Gastroesophageal reflux disease without esophagitis    ASCUS with positive high risk HPV cervical    Family history of ulcerative colitis    Skin lesion     Past Surgical History:   Procedure Laterality Date     SECTION      COMBINED ESOPHAGOSCOPY, GASTROSCOPY, DUODENOSCOPY (EGD) WITH CO2 INSUFFLATION N/A 2019    Procedure: COMBINED ESOPHAGOSCOPY, GASTROSCOPY, DUODENOSCOPY (EGD) WITH  CO2 INSUFFLATION;  Surgeon: Chacorta Nunez MD;  Location: [facility]    ESOPHAGOSCOPY, GASTROSCOPY, DUODENOSCOPY (EGD), COMBINED N/A 2/1/2019    Procedure: Combined Esophagoscopy, Gastroscopy, Duodenoscopy (Egd), Biopsy Single Or Multiple;  Surgeon: Chacorta Nunez MD;  Location: [facility]    EXCISE LESION FACE Left 11/17/2022    Procedure: Left lower lip lesion excision, complex closure,;  Surgeon: Hernandez Arriaga MD;  Location: [facility]       Social History     Tobacco Use    Smoking status: Never     Passive exposure: Never    Smokeless tobacco: Never   Substance Use Topics    Alcohol use: Yes     Comment: rarely     Family History   Problem Relation Age of Onset    Arthritis Mother         fibromyalgia    Crohn's Disease Mother     Ulcerative Colitis Mother     Inflammatory Bowel Disease Mother     Diabetes Mother         Type @    Hypertension Mother     Cerebrovascular Disease Maternal Grandfather         n/a    Diabetes Maternal Grandfather     Inflammatory Bowel Disease Daughter     Hypertension Maternal Grandmother          Current Outpatient Medications   Medication Sig Dispense Refill    famotidine (PEPCID) 20 MG tablet TAKE 1 TABLET BY MOUTH TWICE A  tablet 0    magic mouthwash (ENTER INGREDIENTS IN COMMENTS) suspension Take 5 mLs by mouth every 6 hours as needed Maalox, Nystatin and benadryl (Patient not taking: Reported on 8/1/2024) 150 mL 0     Allergies   Allergen Reactions    No Known Drug Allergy        CONSTITUTIONAL:POSITIVE  for fatigue and not sleeping well. Brain will not let her rest sometimes NEGATIVE  for anorexia  INTEGUMENTARY/SKIN: NEGATIVE for worrisome rashes, moles or lesions  EYES: NEGATIVE for vision changes or irritation  ENT: POSITIVE for snoring and family hx positive  and NEGATIVE for Hx ear infections  RESP:NEGATIVE for significant cough or SOB  BREAST: NEGATIVE for masses, tenderness or discharge  CV: NEGATIVE for chest pain, palpitations or peripheral  "edema  GI: NEGATIVE for nausea, abdominal pain, heartburn, or change in bowel habits   female: no unusual urinary symptoms, no unusual vaginal symptoms, and menses: very regular and very heavy for two days  Had IUD in the past but the third time was bleeding all the time. Sees Dr Rush for her GYN   Has noticed in the last year that menses have been very heavy   MUSCULOSKELETAL:NEGATIVE for significant arthralgias or myalgia  NEURO: NEGATIVE for weakness, dizziness or paresthesias  ENDOCRINE: NEGATIVE for temperature intolerance, skin/hair changes  HEME/ALLERGY/IMMUNE: NEGATIVE for bleeding problems  PSYCHIATRIC: POSITIVE forHx anxiety and doing well now  and NEGATIVE forthoughts of hurting someone else and thoughts of self harm            Objective    Exam  /83 (BP Location: Right arm, Patient Position: Sitting, Cuff Size: Adult Regular)   Pulse 80   Temp 97.9  F (36.6  C) (Oral)   Resp 16   Ht 1.6 m (5' 3\")   Wt 69.6 kg (153 lb 7 oz)   LMP 07/12/2024 (Exact Date)   SpO2 100%   BMI 27.18 kg/m     Estimated body mass index is 27.18 kg/m  as calculated from the following:    Height as of this encounter: 1.6 m (5' 3\").    Weight as of this encounter: 69.6 kg (153 lb 7 oz).    Physical Exam  GENERAL: alert and no distress  EYES: Eyes grossly normal to inspection and conjunctivae and sclerae normal  HENT: ear canals and TM's normal, nose and mouth without ulcers or lesions  NECK: no adenopathy, no asymmetry, masses, or scars  RESP: lungs clear to auscultation - no rales, rhonchi or wheezes  BREAST: normal without masses, tenderness or nipple discharge and no palpable axillary masses or adenopathy  CV: regular rates and rhythm, no murmur, click or rub, peripheral pulses strong, and no peripheral edema  ABDOMEN: soft, nontender, no hepatosplenomegaly, no masses and bowel sounds normal   (female): deferred  MS: no gross musculoskeletal defects noted, no edema  SKIN: no suspicious lesions or " rashes  NEURO: Normal strength and tone, mentation intact and speech normal  PSYCH: mentation appears normal, affect normal/bright  LYMPH: no cervical, supraclavicular, axillary, or inguinal adenopathy    Results for orders placed or performed in visit on 08/01/24   Lipid panel reflex to direct LDL Fasting     Status: Abnormal   Result Value Ref Range    Cholesterol 174 <200 mg/dL    Triglycerides 64 <150 mg/dL    Direct Measure HDL 57 >=50 mg/dL    LDL Cholesterol Calculated 104 (H) <=100 mg/dL    Non HDL Cholesterol 117 <130 mg/dL    Patient Fasting > 8hrs? Yes     Narrative    Cholesterol  Desirable:  <200 mg/dL    Triglycerides  Normal:  Less than 150 mg/dL  Borderline High:  150-199 mg/dL  High:  200-499 mg/dL  Very High:  Greater than or equal to 500 mg/dL    Direct Measure HDL  Female:  Greater than or equal to 50 mg/dL   Male:  Greater than or equal to 40 mg/dL    LDL Cholesterol  Desirable:  <100mg/dL  Above Desirable:  100-129 mg/dL   Borderline High:  130-159 mg/dL   High:  160-189 mg/dL   Very High:  >= 190 mg/dL    Non HDL Cholesterol  Desirable:  130 mg/dL  Above Desirable:  130-159 mg/dL  Borderline High:  160-189 mg/dL  High:  190-219 mg/dL  Very High:  Greater than or equal to 220 mg/dL   CBC with platelets     Status: Abnormal   Result Value Ref Range    WBC Count 5.7 4.0 - 11.0 10e3/uL    RBC Count 4.49 3.80 - 5.20 10e6/uL    Hemoglobin 11.5 (L) 11.7 - 15.7 g/dL    Hematocrit 36.5 35.0 - 47.0 %    MCV 81 78 - 100 fL    MCH 25.6 (L) 26.5 - 33.0 pg    MCHC 31.5 31.5 - 36.5 g/dL    RDW 13.3 10.0 - 15.0 %    Platelet Count 371 150 - 450 10e3/uL   Comprehensive metabolic panel     Status: Abnormal   Result Value Ref Range    Sodium 143 135 - 145 mmol/L    Potassium 4.8 3.4 - 5.3 mmol/L    Carbon Dioxide (CO2) 27 22 - 29 mmol/L    Anion Gap 8 7 - 15 mmol/L    Urea Nitrogen 15.1 6.0 - 20.0 mg/dL    Creatinine 0.99 (H) 0.51 - 0.95 mg/dL    GFR Estimate 71 >60 mL/min/1.73m2    Calcium 9.1 8.8 - 10.4 mg/dL     Chloride 108 (H) 98 - 107 mmol/L    Glucose 95 70 - 99 mg/dL    Alkaline Phosphatase 67 40 - 150 U/L    AST 19 0 - 45 U/L    ALT 12 0 - 50 U/L    Protein Total 7.1 6.4 - 8.3 g/dL    Albumin 4.2 3.5 - 5.2 g/dL    Bilirubin Total 0.4 <=1.2 mg/dL    Patient Fasting > 8hrs? Yes    TSH with free T4 reflex     Status: Normal   Result Value Ref Range    TSH 2.30 0.30 - 4.20 uIU/mL   Ferritin     Status: Normal   Result Value Ref Range    Ferritin 8 6 - 175 ng/mL   Iron & Iron Binding Capacity     Status: Normal   Result Value Ref Range    Iron 56 37 - 145 ug/dL    Iron Binding Capacity 342 240 - 430 ug/dL    Iron Sat Index 16 15 - 46 %         Signed Electronically by: ROMA Gallagher CNP

## 2024-08-01 NOTE — PATIENT INSTRUCTIONS
PLAN:   1.   Symptomatic therapy suggested: Increase calcium to 1000mg and 1000 international unit(s) Vit D .  2.  Orders Placed This Encounter   Medications    famotidine (PEPCID) 20 MG tablet     Sig: Take 1 tablet (20 mg) by mouth 2 times daily     Dispense:  180 tablet     Refill:  3     Orders Placed This Encounter   Procedures    REVIEW OF HEALTH MAINTENANCE PROTOCOL ORDERS    US Pelvic Transabdominal and Transvaginal    HEPATITIS B, ADULT 20+ (ENGERIX-B/RECOMBIVAX HB)    COVID-19 12+ (2023-24) (PFIZER)    Lipid panel reflex to direct LDL Fasting    CBC with platelets    Comprehensive metabolic panel    TSH with free T4 reflex    Ferritin    Iron & Iron Binding Capacity    Adult Sleep Eval & Management  Referral       3.  FURTHER TESTING:       - pelvic  ultrasound  Will follow up and/or notify patient of  results via My Chart to determine further need for followup   Follow up office visit in one year for annual health maintenance exam, sooner PRN.   Patient needs to follow up in if no improvement,or sooner if worsening of symptoms or other symptoms develop.          Patient Education   Preventive Care Advice   This is general advice given by our system to help you stay healthy. However, your care team may have specific advice just for you. Please talk to your care team about your preventive care needs.  Nutrition  Eat 5 or more servings of fruits and vegetables each day.  Try wheat bread, brown rice and whole grain pasta (instead of white bread, rice, and pasta).  Get enough calcium and vitamin D. Check the label on foods and aim for 100% of the RDA (recommended daily allowance).  Lifestyle  Exercise at least 150 minutes each week  (30 minutes a day, 5 days a week).  Do muscle strengthening activities 2 days a week. These help control your weight and prevent disease.  No smoking.  Wear sunscreen to prevent skin cancer.  Have a dental exam and cleaning every 6 months.  Yearly exams  See your health  care team every year to talk about:  Any changes in your health.  Any medicines your care team has prescribed.  Preventive care, family planning, and ways to prevent chronic diseases.  Shots (vaccines)   HPV shots (up to age 26), if you've never had them before.  Hepatitis B shots (up to age 59), if you've never had them before.  COVID-19 shot: Get this shot when it's due.  Flu shot: Get a flu shot every year.  Tetanus shot: Get a tetanus shot every 10 years.  Pneumococcal, hepatitis A, and RSV shots: Ask your care team if you need these based on your risk.  Shingles shot (for age 50 and up)  General health tests  Diabetes screening:  Starting at age 35, Get screened for diabetes at least every 3 years.  If you are younger than age 35, ask your care team if you should be screened for diabetes.  Cholesterol test: At age 39, start having a cholesterol test every 5 years, or more often if advised.  Bone density scan (DEXA): At age 50, ask your care team if you should have this scan for osteoporosis (brittle bones).  Hepatitis C: Get tested at least once in your life.  STIs (sexually transmitted infections)  Before age 24: Ask your care team if you should be screened for STIs.  After age 24: Get screened for STIs if you're at risk. You are at risk for STIs (including HIV) if:  You are sexually active with more than one person.  You don't use condoms every time.  You or a partner was diagnosed with a sexually transmitted infection.  If you are at risk for HIV, ask about PrEP medicine to prevent HIV.  Get tested for HIV at least once in your life, whether you are at risk for HIV or not.  Cancer screening tests  Cervical cancer screening: If you have a cervix, begin getting regular cervical cancer screening tests starting at age 21.  Breast cancer scan (mammogram): If you've ever had breasts, begin having regular mammograms starting at age 40. This is a scan to check for breast cancer.  Colon cancer screening: It is  important to start screening for colon cancer at age 45.  Have a colonoscopy test every 10 years (or more often if you're at risk) Or, ask your provider about stool tests like a FIT test every year or Cologuard test every 3 years.  To learn more about your testing options, visit:   .  For help making a decision, visit:   https://bit.ly/me26772.  Prostate cancer screening test: If you have a prostate, ask your care team if a prostate cancer screening test (PSA) at age 55 is right for you.  Lung cancer screening: If you are a current or former smoker ages 50 to 80, ask your care team if ongoing lung cancer screenings are right for you.  For informational purposes only. Not to replace the advice of your health care provider. Copyright   2023 [employer]. All rights reserved. Clinically reviewed by the [facility]. [employer] [id number] - REV 01/24.      No 0

## 2024-08-02 NOTE — RESULT ENCOUNTER NOTE
Ricardo Coronado,    Attached are your test results.  -Hemoglobin is decreased indicating anemia.  Anemia can cause fatigue and, occasionally, light-headedness.  This is common in menstruating women and is usually caused by an iron deficiency.  ADVISE: eating a diet has a lot of iron-rich foods such as lean red meat and green, leafy vegetables.  You should take a  daily iron supplement (ferrous gluconate 325mg.) Then, rechecking this lab in 3 months.  -White blood cell and platelet counts are normal.  -LDL(bad) cholesterol level is elevated which can increase your heart disease risk.  A diet high in fat and simple carbohydrates, genetics and being overweight can contribute to this. ADVISE: exercising 150 minutes of aerobic exercise per week (30 minutes for 5 days per week or 50 minutes for 3 days per week are options) and eating a low saturated fat/low carbohydrate diet are helpful to improve this. In 12 months, you should recheck your fasting cholesterol panel by scheduling a lab-only appointment.  -Liver and gallbladder tests are normal (ALT,AST, Alk phos, bilirubin), kidney function is normal (Cr, GFR), sodium is normal, potassium is normal, calcium is normal, glucose is normal.  -TSH (thyroid stimulating hormone) level is normal which indicates normal thyroid function.  -Ferritin (iron) level is normal.but very low end of normal    Please contact us if you have any questions.    Samanta Narvaez, CNP

## 2024-08-19 ENCOUNTER — ANCILLARY PROCEDURE (OUTPATIENT)
Dept: ULTRASOUND IMAGING | Facility: CLINIC | Age: 44
End: 2024-08-19
Attending: NURSE PRACTITIONER
Payer: COMMERCIAL

## 2024-08-19 DIAGNOSIS — N92.0 MENORRHAGIA WITH REGULAR CYCLE: ICD-10-CM

## 2024-08-19 PROCEDURE — 76856 US EXAM PELVIC COMPLETE: CPT | Performed by: RADIOLOGY

## 2024-08-19 PROCEDURE — 76830 TRANSVAGINAL US NON-OB: CPT | Performed by: RADIOLOGY

## 2024-08-20 NOTE — RESULT ENCOUNTER NOTE
Ricardo Coronado,    Attached are your test results.  Pelvic ultrasound showed changes in the left pelvis recommending MRI to look further   I will place order. Please call [phone] to schedule.   Please contact us if you have any questions.    Samanta Narvaez, CNP

## 2024-09-20 ENCOUNTER — ANCILLARY PROCEDURE (OUTPATIENT)
Dept: MRI IMAGING | Facility: CLINIC | Age: 44
End: 2024-09-20
Attending: NURSE PRACTITIONER
Payer: COMMERCIAL

## 2024-09-20 DIAGNOSIS — R93.89 ABNORMAL PELVIC ULTRASOUND: ICD-10-CM

## 2024-09-20 DIAGNOSIS — N92.0 MENORRHAGIA WITH REGULAR CYCLE: ICD-10-CM

## 2024-09-20 PROCEDURE — A9585 GADOBUTROL INJECTION: HCPCS | Performed by: STUDENT IN AN ORGANIZED HEALTH CARE EDUCATION/TRAINING PROGRAM

## 2024-09-20 PROCEDURE — 72197 MRI PELVIS W/O & W/DYE: CPT | Performed by: STUDENT IN AN ORGANIZED HEALTH CARE EDUCATION/TRAINING PROGRAM

## 2024-09-20 RX ORDER — GADOBUTROL 604.72 MG/ML
7 INJECTION INTRAVENOUS ONCE
Status: COMPLETED | OUTPATIENT
Start: 2024-09-20 | End: 2024-09-20

## 2024-09-20 RX ADMIN — GADOBUTROL 7 ML: 604.72 INJECTION INTRAVENOUS at 08:36

## 2024-09-23 NOTE — RESULT ENCOUNTER NOTE
Ricardo Coronado,    Attached are your test results.  Simple cyst bilaterally and area in left pelvis.Possible fluid in fallopian tube? Keep appointment with Dr Rush as planned    Please contact us if you have any questions.    Samanta Narvaez, CNP

## 2024-10-18 ENCOUNTER — OFFICE VISIT (OUTPATIENT)
Dept: OBGYN | Facility: CLINIC | Age: 44
End: 2024-10-18
Payer: COMMERCIAL

## 2024-10-18 VITALS
HEIGHT: 63 IN | DIASTOLIC BLOOD PRESSURE: 81 MMHG | WEIGHT: 155.3 LBS | SYSTOLIC BLOOD PRESSURE: 139 MMHG | BODY MASS INDEX: 27.52 KG/M2

## 2024-10-18 DIAGNOSIS — N93.9 ABNORMAL UTERINE BLEEDING (AUB): Primary | ICD-10-CM

## 2024-10-18 PROCEDURE — 99214 OFFICE O/P EST MOD 30 MIN: CPT | Performed by: OBSTETRICS & GYNECOLOGY

## 2024-10-18 NOTE — PATIENT INSTRUCTIONS
Treatment for bleeding includes medication - pills, patch, vaginal ring, IUD, implant, and injection.   Surgical options include an ablation, which has less risk and down time.  Also may consider hysterectomy.      Non-hormonal option:  Lysteda three times daily for up to 5 days per month.  Typically just use on the 2-3 heavy days.    The fluid around the left fallopian - could be an old infection. No evidence of infection and this time and should not change.  It may be removed if you start experiencing pain.    Recommend continuing iron supplement and iron rich diet.                If you have labs or imaging done, the results will automatically release in [employer] without an interpretation.  Your health care professional will review those results and send an interpretation with recommendations as soon as possible, but this may be 1-3 business days.    If you have any questions regarding your visit, please contact your care team.     [employer] [facility]: [phone]  Women s Health CLINIC HOURS TELEPHONE NUMBER       MD Genna Rodriguez - Certified Medical Assistant     Magdalene CINTRON RN  Marychuy-SULLY Marquez-SULLY Thorne-  [place]-     Monday- [place]  8:00 a.m - 5:00 p.m    Tuesday- Surgery        Thursday- [place]  8:00 a.m - 5:00 p.m.    Friday- [place]  7:30 a.m - 4:00 p.m. [facility]  [address]  [place] [address]  [phone] Fax  [phone] Phone  Imaging Scheduling [phone]    [facility]  [address] [place] [address] [place] [address]  [phone]    [address] [place] [address]  [phone] Phone  [phone] Fax  Imaging Scheduling [phone]     Urgent Care locations:  [facility] Monday-Friday  10 am - 8 pm  Saturday and Sunday   9 am - 5 pm  Monday-Friday   10 am - 8 pm  Saturday and Sunday   9 am - 5 pm   [phone]      **Surgeries** Our Surgery Schedulers will contact you to schedule. If you do not receive a call within 3 business days, please call [phone].    If you need a medication refill, please contact your pharmacy. Please allow 3 business days for your refill to be completed.    As always, thank you for trusting us with your healthcare needs!

## 2025-01-17 ASSESSMENT — SLEEP AND FATIGUE QUESTIONNAIRES
HOW LIKELY ARE YOU TO NOD OFF OR FALL ASLEEP IN A CAR, WHILE STOPPED FOR A FEW MINUTES IN TRAFFIC: WOULD NEVER DOZE
HOW LIKELY ARE YOU TO NOD OFF OR FALL ASLEEP WHILE LYING DOWN TO REST IN THE AFTERNOON WHEN CIRCUMSTANCES PERMIT: SLIGHT CHANCE OF DOZING
HOW LIKELY ARE YOU TO NOD OFF OR FALL ASLEEP WHILE SITTING AND TALKING TO SOMEONE: WOULD NEVER DOZE
HOW LIKELY ARE YOU TO NOD OFF OR FALL ASLEEP WHILE WATCHING TV: WOULD NEVER DOZE
HOW LIKELY ARE YOU TO NOD OFF OR FALL ASLEEP WHILE SITTING QUIETLY AFTER LUNCH WITHOUT ALCOHOL: WOULD NEVER DOZE
HOW LIKELY ARE YOU TO NOD OFF OR FALL ASLEEP WHEN YOU ARE A PASSENGER IN A CAR FOR AN HOUR WITHOUT A BREAK: WOULD NEVER DOZE
HOW LIKELY ARE YOU TO NOD OFF OR FALL ASLEEP WHILE SITTING INACTIVE IN A PUBLIC PLACE: WOULD NEVER DOZE
HOW LIKELY ARE YOU TO NOD OFF OR FALL ASLEEP WHILE SITTING AND READING: WOULD NEVER DOZE

## 2025-01-18 PROBLEM — D12.4 BENIGN NEOPLASM OF DESCENDING COLON: Status: ACTIVE | Noted: 2024-03-04

## 2025-01-18 PROBLEM — K21.9 GASTROESOPHAGEAL REFLUX DISEASE WITHOUT ESOPHAGITIS: Chronic | Status: ACTIVE | Noted: 2019-01-19

## 2025-01-18 PROBLEM — D12.3 BENIGN NEOPLASM OF TRANSVERSE COLON: Status: ACTIVE | Noted: 2024-03-04

## 2025-01-20 ENCOUNTER — VIRTUAL VISIT (OUTPATIENT)
Dept: SLEEP MEDICINE | Facility: CLINIC | Age: 45
End: 2025-01-20
Attending: NURSE PRACTITIONER
Payer: COMMERCIAL

## 2025-01-20 ENCOUNTER — ANCILLARY PROCEDURE (OUTPATIENT)
Dept: MAMMOGRAPHY | Facility: CLINIC | Age: 45
End: 2025-01-20
Attending: NURSE PRACTITIONER
Payer: COMMERCIAL

## 2025-01-20 VITALS — WEIGHT: 145 LBS | BODY MASS INDEX: 25.69 KG/M2 | HEIGHT: 63 IN

## 2025-01-20 DIAGNOSIS — R53.83 MALAISE AND FATIGUE: ICD-10-CM

## 2025-01-20 DIAGNOSIS — R06.89 GASPING FOR BREATH: Primary | ICD-10-CM

## 2025-01-20 DIAGNOSIS — R06.83 SNORING: ICD-10-CM

## 2025-01-20 DIAGNOSIS — Z12.31 VISIT FOR SCREENING MAMMOGRAM: ICD-10-CM

## 2025-01-20 DIAGNOSIS — G47.00 INSOMNIA, UNSPECIFIED TYPE: ICD-10-CM

## 2025-01-20 DIAGNOSIS — R06.00 DYSPNEA AND RESPIRATORY ABNORMALITY: ICD-10-CM

## 2025-01-20 DIAGNOSIS — R06.89 DYSPNEA AND RESPIRATORY ABNORMALITY: ICD-10-CM

## 2025-01-20 DIAGNOSIS — R53.81 MALAISE AND FATIGUE: ICD-10-CM

## 2025-01-20 DIAGNOSIS — Z72.820 LACK OF ADEQUATE SLEEP: ICD-10-CM

## 2025-01-20 PROCEDURE — 98002 SYNCH AUDIO-VIDEO NEW MOD 45: CPT | Performed by: PHYSICIAN ASSISTANT

## 2025-01-20 PROCEDURE — 77063 BREAST TOMOSYNTHESIS BI: CPT | Performed by: RADIOLOGY

## 2025-01-20 PROCEDURE — 77067 SCR MAMMO BI INCL CAD: CPT | Performed by: RADIOLOGY

## 2025-01-20 ASSESSMENT — PAIN SCALES - GENERAL: PAINLEVEL_OUTOF10: NO PAIN (0)

## 2025-01-20 NOTE — PROGRESS NOTES
Virtual Visit Details    Type of service:  Video Visit     Originating Location (pt. Location): Home    Distant Location (provider location):  On-site  Platform used for Video Visit: [facility]    Outpatient Sleep Medicine Consultation:      Name: Zulma Coronado MRN# [id number]   Age: 44 year old YOB: 1980     Date of Consultation: January 20, 2025  Consultation is requested by: Samanta Narvaez, APRN CNP  [address] [place] [address] Samanta Narvaez  Primary care provider: Samanta Narvaez       Reason for Sleep Consult:     Zulma Coronado is sent by Samanta Narvaez for a sleep consultation regarding sleep apnea.    Patient s Reason for visit  Zulma Coronado main reason for visit: (Patient-Rptd) Occasionally have nights where I wake up gasping. Family history of sleep apnea  Patient states problem(s) started: (Patient-Rptd) Maybe 2 years ago  Zulma Coronado's goals for this visit: (Patient-Rptd) Discuss next steps to figure out if i donor don't have sleep apnea.           Assessment and Plan:     Impression:  Patient has features and risk factors for possible obstructive sleep apnea including: gasping for breath, snoring, non-refreshing sleep, daytime fatigue and strong family history of TAMARA. The STOP-BANG score is 2/8. The pathophysiology, diagnosis and treatment of TAMARA was discussed. Also the pros and cons of HST versus and attended polysomnogram    Plan:    1. Will start with a Home Sleep Apnea Testing to evaluate for obstructive sleep apnea.    2. She is interested in a mandibular advancement device for mild sleep apnea and CPAP for moderate to severe sleep apnea.    3. Recommend weight management.      Comorbid Diagnoses:    Summary Recommendations:  Orders Placed This Encounter   Procedures    HST-Home Sleep Apnea Test - Noxturnal Returnable     Summary Counseling:    Sleep Testing Reviewed  Obstructive Sleep Apnea Reviewed  Complications of Untreated Sleep  Apnea Reviewed      Medical Decision-making:   Educational materials provided in instructions    Total time spent reviewing medical records, history and physical examination, review of previous testing and interpretation as well as documentation on this date:45 minutes    CC: Samanta Narvaez          History of Present Illness:     Past Sleep Evaluations: NA    SLEEP-WAKE SCHEDULE:     Work/School Days: Patient goes to school/work: (Patient-Rptd) Yes   Usually gets into bed at (Patient-Rptd) 9:30-10  Takes patient about (Patient-Rptd) Inconsistent. Sometimes very quickly ans other times hours. to fall asleep  Has trouble falling asleep (Patient-Rptd) 1-2 nights per week. Her mind is active.   Wakes up in the middle of the night (Patient-Rptd) 2-4 would be my guess times.  Wakes up due to (Patient-Rptd) Pain;Uncertain  She has trouble falling back asleep (Patient-Rptd) This happens less often 1-2 a month maybe times a week.   It usually takes (Patient-Rptd) Fairly quick to get back to sleep  Patient is usually up at (Patient-Rptd) 5:20am  Uses alarm: (Patient-Rptd) Yes    Weekends/Non-work Days/All Other Days:  Usually gets into bed at (Patient-Rptd)    Takes patient about (Patient-Rptd) Not long at all to fall asleep  Patient is usually up at (Patient-Rptd) 6:30-7  Uses alarm: (Patient-Rptd) No    Sleep Need  Patient gets  (Patient-Rptd) 6-7 hours a night sleep on average   Patient thinks she needs about (Patient-Rptd) 8-9 sleep    Zulma Coronado prefers to sleep in this position(s): (Patient-Rptd) Side   Patient states they do the following activities in bed: (Patient-Rptd) Watch TV    Naps  Patient takes a purposeful nap (Patient-Rptd) Almost never times a week and naps are usually (Patient-Rptd) N/A in duration  She feels better after a nap: (Patient-Rptd) No  She dozes off unintentionally (Patient-Rptd) 0 days per week  Patient has had a driving accident or near-miss due to sleepiness/drowsiness:  (Patient-Rptd) No      SLEEP DISRUPTIONS:    Breathing/Snoring  Patient snores: yes. She   Other people complain about her snoring:    Patient has been told she stops breathing in her sleep: gasping for breathing   She has issues with the following: (Patient-Rptd) Stuffy nose when you wake up    Movement:  Patient gets pain, discomfort, with an urge to move:  (Patient-Rptd) Yes. Rare  It happens when she is resting:  (Patient-Rptd) Yes  It happens more at night:  (Patient-Rptd) Yes  Patient has been told she kicks her legs at night:  (Patient-Rptd) No     Behaviors in Sleep:  Zulma Coronado has experienced the following behaviors while sleeping: (Patient-Rptd) Recurring Nightmares;Sleep-talking;Sleepwalking in childhood.   She has experienced sudden muscle weakness during the day: (Patient-Rptd) No      Is there anything else you would like your sleep provider to know:        CAFFEINE AND OTHER SUBSTANCES:    Patient consumes caffeinated beverages per day:  (Patient-Rptd) 1  Last caffeine use is usually: (Patient-Rptd) 9am  List of any prescribed or over the counter stimulants that patient takes:    List of any prescribed or over the counter sleep medication patient takes:    List of previous sleep medications that patient has tried: (Patient-Rptd) Melatonin  Patient drinks alcohol to help them sleep: (Patient-Rptd) No  Patient drinks alcohol near bedtime: (Patient-Rptd) No    Family History:  Patient has a family member been diagnosed with a sleep disorder: (Patient-Rptd) Yes  (Patient-Rptd) Sleep apnea (brother and father)       SCALES:    EPWORTH SLEEPINESS SCALE         1/17/2025     1:58 PM    [place] Sleepiness Scale ( KANE Obrien  [id number]<br>ESS - [place]/English - Final version - 21 Nov 07 - [facility] [place].)   Sitting and reading Would never doze   Watching TV Would never doze   Sitting, inactive in a public place (e.g. a theatre or a meeting) Would never doze   As a passenger in a car for an hour  without a break Would never doze   Lying down to rest in the afternoon when circumstances permit Slight chance of dozing   Sitting and talking to someone Would never doze   Sitting quietly after a lunch without alcohol Would never doze   In a car, while stopped for a few minutes in traffic Would never doze   Cinebar Score (MC) 1   Cinebar Score (Sleep) 1        Patient-reported         INSOMNIA SEVERITY INDEX (JAYLYN)          1/17/2025     1:43 PM   Insomnia Severity Index (JAYLYN)   Difficulty falling asleep 2   Difficulty staying asleep 2   Problems waking up too early 2   How SATISFIED/DISSATISFIED are you with your CURRENT sleep pattern? 3   How NOTICEABLE to others do you think your sleep problem is in terms of impairing the quality of your life? 2   How WORRIED/DISTRESSED are you about your current sleep problem? 1   To what extent do you consider your sleep problem to INTERFERE with your daily functioning (e.g. daytime fatigue, mood, ability to function at work/daily chores, concentration, memory, mood, etc.) CURRENTLY? 2   JAYLYN Total Score 14        Patient-reported       Guidelines for Scoring/Interpretation:  Total score categories:  0-7 = No clinically significant insomnia   8-14 = Subthreshold insomnia   15-21 = Clinical insomnia (moderate severity)  22-28 = Clinical insomnia (severe)  Used via courtesy of www.[employer].va.gov with permission from Tha Wilkerson PhD., [facility]      STOP BANG         1/20/2025     7:37 AM   STOP BANG Questionnaire (  2008, the American Society of Anesthesiologists, Inc. [employer]   B/P Clinic: --   BMI Clinic: 25.69         GAD7        3/29/2022     9:29 AM   JOSHUA-7    1. Feeling nervous, anxious, or on edge 2   2. Not being able to stop or control worrying 2   3. Worrying too much about different things 2   4. Trouble relaxing 2   5. Being so restless that it is hard to sit still 1   6. Becoming easily annoyed or irritable 1   7. Feeling  "afraid, as if something awful might happen 1   JOSHUA-7 Total Score 11         CAGE-AID        3/29/2022     9:55 AM   CAGE-AID Flowsheet   Have you ever felt you should Cut down on your drinking or drug use? 0   Have people Annoyed you by criticizing your drinking or drug use? 0   Have you ever felt bad or Guilty about your drinking or drug use? 0   Have you ever had a drink or used drugs first thing in the morning to steady your nerves or to get rid of a hangover? (Eye opener) 0   CAGE-AID SCORE 0   Have you ever felt you should Cut down on your drinking or drug use? No   Have people Annoyed you by criticizing your drinking or drug use? No   Have you ever felt bad or Guilty about your drinking or drug use? No   Have you ever had a drink or used drugs first thing in the morning to steady your nerves or to get rid of a hangover? (Eye opener) No   CAGE-AID SCORE 0 (A total score of 2 or greater is considered clinically significant)       CAGE-AID reprinted with permission from the [place] Medical Journal, SHAHEEN Coleman. and ADAM Felipe, \"Conjoint screening questionnaires for alcohol and drug abuse\" [place] Medical Journal 94: 135-140, 1995.      PATIENT HEALTH QUESTIONNAIRE-9 (PHQ - 9)        3/29/2022     9:16 AM   PHQ-9 ([employer])   1.  Little interest or pleasure in doing things 1   2.  Feeling down, depressed, or hopeless 0   3.  Trouble falling or staying asleep, or sleeping too much 0   4.  Feeling tired or having little energy 0   5.  Poor appetite or overeating 1   6.  Feeling bad about yourself - or that you are a failure or have let yourself or your family down 1   7.  Trouble concentrating on things, such as reading the newspaper or watching television 0   8.  Moving or speaking so slowly that other people could have noticed. Or the opposite - being so fidgety or restless that you have been moving around a lot more than usual 0   9.  Thoughts that you would be better off dead, or of hurting yourself in some way " "0   PHQ-9 Total Score 3   6.  Feeling bad about yourself 1   7.  Trouble concentrating 0   8.  Moving slowly or restless 0   9.  Suicidal or self-harm thoughts 0   1.  Little interest or pleasure in doing things Several days   2.  Feeling down, depressed, or hopeless Not at all   3.  Trouble falling or staying asleep, or sleeping too much Not at all   4.  Feeling tired or having little energy Not at all   5.  Poor appetite or overeating Several days   6.  Feeling bad about yourself Several days   7.  Trouble concentrating Not at all   8.  Moving slowly or restless Not at all   9.  Suicidal or self-harm thoughts Not at all   PHQ-9 via [employer] TOTAL SCORE-----> 3 (Minimal depression)   Difficulty at work, home, or with people Somewhat difficult       Developed by Mirta Patel, Sandra Hdz, Jasen Ryan and colleagues, with an educational john from [employer] No permission required to reproduce, translate, display or distribute.        Allergies:    Allergies   Allergen Reactions    No Known Drug Allergy        Medications:    Current Outpatient Medications   Medication Sig Dispense Refill    famotidine (PEPCID) 20 MG tablet Take 1 tablet (20 mg) by mouth 2 times daily 180 tablet 3    Ferrous Sulfate (IRON PO) Take by mouth.         Problem List:  Patient Active Problem List    Diagnosis Date Noted    Benign neoplasm of descending colon 03/04/2024     Priority: Medium    Benign neoplasm of transverse colon 03/04/2024     Priority: Medium    Skin lesion 10/27/2022     Priority: Medium     Added automatically from request for surgery [id number]      Family history of ulcerative colitis 06/10/2022     Priority: Medium    ASCUS with positive high risk HPV cervical 08/23/2019     Priority: Medium     7/2018 ASCUS, + hr HPV  at [facility]  8/2018 [facility]: Negative  At [facility]  Plan: Cotesting 8/2019 8/23/19 NIL pap, + HR HPV (not 16 or 18). Plan: [facility]  11/22/19 [facility]CC \"cervicitis and epithelial atypia\". Plan: " cotest due 20 NIL Pap, Neg HR HPV. Plan: Cotest in 3 years.  2/3/23 NIL Pap, Neg HR HPV. Plan: cotest in 3 years.         Gastroesophageal reflux disease without esophagitis 2019     Priority: Medium        Past Medical/Surgical History:  Past Medical History:   Diagnosis Date    Human papillomavirus in conditions classified elsewhere and of unspecified site 2000    Meningitis, unspecified(322.9) 1980    Meningitis, viral     Past Surgical History:   Procedure Laterality Date     SECTION      COMBINED ESOPHAGOSCOPY, GASTROSCOPY, DUODENOSCOPY (EGD) WITH CO2 INSUFFLATION N/A 2019    Procedure: COMBINED ESOPHAGOSCOPY, GASTROSCOPY, DUODENOSCOPY (EGD) WITH CO2 INSUFFLATION;  Surgeon: Chacorta Nunez MD;  Location: [facility] OR    ESOPHAGOSCOPY, GASTROSCOPY, DUODENOSCOPY (EGD), COMBINED N/A 2019    Procedure: Combined Esophagoscopy, Gastroscopy, Duodenoscopy (Egd), Biopsy Single Or Multiple;  Surgeon: Chacorta Nunez MD;  Location: [facility] OR    EXCISE LESION FACE Left 2022    Procedure: Left lower lip lesion excision, complex closure,;  Surgeon: Hernandez Arriaga MD;  Location: [facility] OR       Social History:  Social History     Socioeconomic History    Marital status: Single     Spouse name: Luis Antonio    Number of children: 0    Years of education: 15    Highest education level: Not on file   Occupational History    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 No   Social History Narrative    Not on file     Social Drivers of Health     Financial Resource Strain: Low Risk  (2024)     Financial Resource Strain     Within the past 12 months, have you or your family members you live with been unable to get utilities (heat, electricity) when it was really needed?: No   Food Insecurity: Low Risk  (7/31/2024)    Food Insecurity     Within the past 12 months, did you worry that your food would run out before you got money to buy more?: No     Within the past 12 months, did the food you bought just not last and you didn t have money to get more?: No   Transportation Needs: Low Risk  (7/31/2024)    Transportation Needs     Within the past 12 months, has lack of transportation kept you from medical appointments, getting your medicines, non-medical meetings or appointments, work, or from getting things that you need?: No   Physical Activity: Sufficiently Active (7/31/2024)    Exercise Vital Sign     Days of Exercise per Week: 4 days     Minutes of Exercise per Session: 60 min   Stress: Stress Concern Present (7/31/2024)    [employer] [employer] - Occupational Stress Questionnaire     Feeling of Stress : To some extent   Social Connections: Unknown (7/31/2024)    Social Connection and Isolation Panel [NHANES]     Frequency of Communication with Friends and Family: Not on file     Frequency of Social Gatherings with Friends and Family: Once a week     Attends Worship Services: Not on file     Active Member of Clubs or Organizations: Not on file     Attends Club or Organization Meetings: Not on file     Marital Status: Not on file   Interpersonal Safety: Low Risk  (8/1/2024)    Interpersonal Safety     Do you feel physically and emotionally safe where you currently live?: Yes     Within the past 12 months, have you been hit, slapped, kicked or otherwise physically hurt by someone?: Not on file     Within the past 12 months, have you been humiliated or emotionally abused in other ways by your partner or ex-partner?: No   Housing Stability: Low Risk  (7/31/2024)    Housing Stability     Do  "you have housing? : Yes     Are you worried about losing your housing?: No       Family History:  Family History   Problem Relation Age of Onset    Arthritis Mother         fibromyalgia    Crohn's Disease Mother     Ulcerative Colitis Mother     Inflammatory Bowel Disease Mother     Diabetes Mother         Type @    Hypertension Mother     Cerebrovascular Disease Maternal Grandfather         n/a    Diabetes Maternal Grandfather     Inflammatory Bowel Disease Daughter     Hypertension Maternal Grandmother        Review of Systems:  A complete review of systems reviewed by me is negative with the exeption of what has been mentioned in the history of present illness.  In the last TWO WEEKS have you experienced any of the following symptoms?  Fevers: (Patient-Rptd) No  Night Sweats: (Patient-Rptd) No  Weight Gain: (Patient-Rptd) No  Pain at Night: (Patient-Rptd) Yes  Double Vision: (Patient-Rptd) No  Changes in Vision: (Patient-Rptd) No  Difficulty Breathing through Nose: (Patient-Rptd) No  Sore Throat in Morning: (Patient-Rptd) No  Dry Mouth in the Morning: (Patient-Rptd) No  Shortness of Breath Lying Flat: (Patient-Rptd) No  Shortness of Breath With Activity: (Patient-Rptd) No  Awakening with Shortness of Breath: (Patient-Rptd) No  Increased Cough: (Patient-Rptd) No  Heart Racing at Night: (Patient-Rptd) No  Swelling in Feet or Legs: (Patient-Rptd) No  Diarrhea at Night: (Patient-Rptd) No  Heartburn at Night: (Patient-Rptd) No  Urinating More than Once at Night: (Patient-Rptd) No  Losing Control of Urine at Night: (Patient-Rptd) No  Joint Pains at Night: (Patient-Rptd) Yes  Headaches in Morning: (Patient-Rptd) No  Weakness in Arms or Legs: (Patient-Rptd) No  Depressed Mood: (Patient-Rptd) No  Anxiety: (Patient-Rptd) Yes     Physical Examination:  Vitals: Ht 1.6 m (5' 3\")   Wt 65.8 kg (145 lb)   BMI 25.69 kg/m    BMI= Body mass index is 25.69 kg/m .           GENERAL APPEARANCE: alert and no distress  EYES: Eyes " "grossly normal to inspection  NECK: no adenopathy  RESP: breathing is non-labored   NEURO: mentation intact and speech normal  PSYCH: affect normal/bright  Mallampati Class:   Tonsillar Stage:          Data: All pertinent previous laboratory data reviewed     Recent Labs   Lab Test 08/01/24  0720 07/28/23  0745    139   POTASSIUM 4.8 4.3   CHLORIDE 108* 104   CO2 27 26   ANIONGAP 8 9   GLC 95 96   BUN 15.1 13.3   CR 0.99* 0.91   JESUS 9.1 9.2       Recent Labs   Lab Test 08/01/24  0720   WBC 5.7   RBC 4.49   HGB 11.5*   HCT 36.5   MCV 81   MCH 25.6*   MCHC 31.5   RDW 13.3          Recent Labs   Lab Test 08/01/24  0720   PROTTOTAL 7.1   ALBUMIN 4.2   BILITOTAL 0.4   ALKPHOS 67   AST 19   ALT 12       TSH   Date Value   08/01/2024 2.30 uIU/mL   07/28/2023 1.98 uIU/mL   06/10/2022 2.29 mU/L   10/11/2021 1.24 mU/L       No results found for: \"UAMP\", \"UBARB\", \"BENZODIAZEUR\", \"UCANN\", \"UCOC\", \"OPIT\", \"UPCP\"    Iron Sat Index   Date/Time Value Ref Range Status   08/01/2024 07:20 AM 16 15 - 46 % Final     Ferritin   Date/Time Value Ref Range Status   08/01/2024 07:20 AM 8 6 - 175 ng/mL Final       pH Arterial (no units)   Date Value   03/29/2002 7.5 (H)       DALIA Mcconnell 1/20/2025           "

## 2025-01-20 NOTE — PATIENT INSTRUCTIONS
"          MY TREATMENT INFORMATION FOR SLEEP APNEA-  Zulma Coronado    DOCTOR : DALIA Mcconnell    Am I having a sleep study at a sleep center?  --->Due to normal delays, you will be contacted within 2-4 weeks to schedule    Am I having a home sleep study?  --->Watch the video for the device you are using:    -/drop off device-   https://www.Atariube.com/watch?v=yGGFBdELGhk    -Disposable device sent out require phone/computer application-   https://www.Ungalli.com/watch?v=BCce_vbiwxE      Frequently asked questions:  1. What is Obstructive Sleep Apnea (TAMARA)? TAMARA is the most common type of sleep apnea. Apnea means, \"without breath.\"  Apnea is most often caused by narrowing or collapse of the upper airway as muscles relax during sleep.   Almost everyone has occasional apneas. Most people with sleep apnea have had brief interruptions at night frequently for many years.  The severity of sleep apnea is related to how frequent and severe the events are.   2. What are the consequences of TAMARA? Symptoms include: feeling sleepy during the day, snoring loudly, gasping or stopping of breathing, trouble sleeping, and occasionally morning headaches or heartburn at night.  Sleepiness can be serious and even increase the risk of falling asleep while driving. Other health consequences may include development of high blood pressure and other cardiovascular disease in persons who are susceptible. Untreated TAMARA  can contribute to heart disease, stroke and diabetes.   3. What are the treatment options? In most situations, sleep apnea is a lifelong disease that must be managed with daily therapy. Medications are not effective for sleep apnea and surgery is generally not considered until other therapies have been tried. Your treatment is your choice . Continuous Positive Airway (CPAP) works right away and is the therapy that is effective in nearly everyone. An oral device to hold your jaw forward is usually the next most " reliable option. Other options include postioning devices (to keep you off your back), weight loss, and surgery including a tongue pacing device. There is more detail about some of these options below.  4. Are my sleep studies covered by insurance? Although we will request verification of coverage, we advise you also check in advance of the study to ensure there is coverage.    Important tips for those choosing CPAP and similar devices  REMEMBER-IF YOU RECEIVE A CALL FROM  [phone]-->IT IS TO SETUP A DEVICE  For new devices, sign up for device DOLLY to monitor your device for your followup visits  We encourage you to utilize the [employer] dolly or website ( https://Flyezee.com.Formspring/ ) to monitor your therapy progress and share the data with your healthcare team when you discuss your sleep apnea.                                                    Know your equipment:  CPAP is continuous positive airway pressure that prevents obstructive sleep apnea by keeping the throat from collapsing while you are sleeping. In most cases, the device is  smart  and can slowly self-adjusts if your throat collapses and keeps a record every day of how well you are treated-this information is available to you and your care team.  BPAP is bilevel positive airway pressure that keeps your throat open and also assists each breath with a pressure boost to maintain adequate breathing.  Special kinds of BPAP are used in patients who have inadequate breathing from lung or heart disease. In most cases, the device is  smart  and can slowly self-adjusts to assist breathing. Like CPAP, the device keeps a record of how well you are treated.  Your mask is your connection to the device. You get to choose what feels most comfortable and the staff will help to make sure if fits. Here: are some examples of the different masks that are available: Magnetic mask aids may assist with use but there are safety issues that should be addressed when  considering with magnets* ( see end of discussion).       Key points to remember on your journey with sleep apnea:  Sleep study.  PAP devices often need to be adjusted during a sleep study to show that they are effective and adjusted right.  Good tips to remember: Try wearing just the mask during a quiet time during the day so your body adapts to wearing it. A humidifier is recommended for comfort in most cases to prevent drying of your nose and throat. Allergy medication from your provider may help you if you are having nasal congestion.  Getting settled-in. It takes more than one night for most of us to get used to wearing a mask. Try wearing just the mask during a quiet time during the day so your body adapts to wearing it. A humidifier is recommended for comfort in most cases. Our team will work with you carefully on the first day and will be in contact within 4 days and again at 2 and 4 weeks for advice and remote device adjustments. Your therapy is evaluated by the device each day.   Use it every night. The more you are able to sleep naturally for 7-8 hours, the more likely you will have good sleep and to prevent health risks or symptoms from sleep apnea. Even if you use it 4 hours it helps. Occasionally all of us are unable to use a medical therapy, in sleep apnea, it is not dangerous to miss one night.   Communicate. Call our skilled team on the number provided on the first day if your visit for problems that make it difficult to wear the device. Over 2 out of 3 patients can learn to wear the device long-term with help from our team. Remember to call our team or your sleep providers if you are unable to wear the device as we may have other solutions for those who cannot adapt to mask CPAP therapy. It is recommended that you sleep your sleep provider within the first 3 months and yearly after that if you are not having problems.   Use it for your health. We encourage use of CPAP masks during daytime quiet  periods to allow your face and brain to adapt to the sensation of CPAP so that it will be a more natural sensation to awaken to at night or during naps. This can be very useful during the first few weeks or months of adapting to CPAP though it does not help medically to wear CPAP during wakefulness and  should not be used as a strategy just to meet guidelines.  Take care of your equipment. Make sure you clean your mask and tubing using directions every day and that your filter and mask are replaced as recommended or if they are not working.     *Masks with magnets:  Updated Contraindications  Masks with magnetic components are contraindicated for use by patients where they, or anyone in close physical contact while using the mask, have the following:   Active medical implants that interact with magnets (i.e., pacemakers, implantable cardioverter defibrillators (ICD), neurostimulators, cerebrospinal fluid (CSF) shunts, insulin/infusion pumps)   Metallic implants/objects containing ferromagnetic material (i.e., aneurysm clips/flow disruption devices, embolic coils, stents, valves, electrodes, implants to restore hearing or balance with implanted magnets, ocular implants, metallic splinters in the eye)  Updated Warning  Keep the mask magnets at a safe distance of at least 6 inches (150 mm) away from implants or medical devices that may be adversely affected by magnetic interference. This warning applies to you or anyone in close physical contact with your mask. The magnets are in the frame and lower headgear clips, with a magnetic field strength of up to 400mT. When worn, they connect to secure the mask but may inadvertently detach while asleep.  Implants/medical devices, including those listed within contraindications, may be adversely affected if they change function under external magnetic fields or contain ferromagnetic materials that attract/repel to magnetic fields (some metallic implants, e.g., contact lenses  with metal, dental implants, metallic cranial plates, screws, magdi hole covers, and bone substitute devices). Consult your physician and  of your implant / other medical device for information on the potential adverse effects of magnetic fields.    BESIDES CPAP, WHAT OTHER THERAPIES ARE THERE?    Positioning Device  Positioning devices are generally used when sleep apnea is mild and only occurs on your back.This example shows a pillow that straps around the waist. It may be appropriate for those whose sleep study shows milder sleep apnea that occurs primarily when lying flat on one's back. Preliminary studies have shown benefit but effectiveness at home may need to be verified by a home sleep test. These devices are generally not covered by medical insurance.  Examples of devices that maintain sleeping on the back to prevent snoring and mild sleep apnea.    Belt type body positioner  http://Cellity/    Electronic reminder  http://nightshifttherapy.Providajob/            Oral Appliance  What is oral appliance therapy?  An oral appliance device fits on your teeth at night like a retainer used after having braces. The device is made by a specialized dentist and requires several visits over 1-2 months before a manufactured device is made to fit your teeth and is adjusted to prevent your sleep apnea. Once an oral device is working properly, snoring should be improved. A home sleep test may be recommended at that time if to determine whether the sleep apnea is adequately treated.       Some things to remember:  -Oral devices are often, but not always, covered by your medical insurance. Be sure to check with your insurance provider.   -If you are referred for oral therapy, you will be given a list of specialized dentists to consider or you may choose to visit the Web site of the American Academy of Dental Sleep Medicine  -Oral devices are less likely to work if you have severe sleep apnea or are extremely  overweight.     More detailed information  An oral appliance is a small acrylic device that fits over the upper and lower teeth  (similar to a retainer or a mouth guard). This device slightly moves jaw forward, which moves the base of the tongue forward, opens the airway, improves breathing for effective treat snoring and obstructive sleep apnea in perhaps 7 out of 10 people .  The best working devices are custom-made by a dental device  after a mold is made of the teeth 1, 2, 3.  When is an oral appliance indicated?  Oral appliance therapy is recommended as a first-line treatment for patients with primary snoring, mild sleep apnea, and for patients with moderate sleep apnea who prefer appliance therapy to use of CPAP4, 5. Severity of sleep apnea is determined by sleep testing and is based on the number of respiratory events per hour of sleep.   How successful is oral appliance therapy?  The success rate of oral appliance therapy in patients with mild sleep apnea is 75-80% while in patients with moderate sleep apnea it is 50-70%. The chance of success in patients with severe sleep apnea is 40-50%. The research also shows that oral appliances have a beneficial effect on the cardiovascular health of TAMARA patients at the same magnitude as CPAP therapy7.  Oral appliances should be a second-line treatment in cases of severe sleep apnea, but if not completely successful then a combination therapy utilizing CPAP plus oral appliance therapy may be effective. Oral appliances tend to be effective in a broad range of patients although studies show that the patients who have the highest success are females, younger patients, those with milder disease, and less severe obesity. 3, 6.   Finding a dentist that practices dental sleep medicine  Specific training is available through the American Academy of Dental Sleep Medicine for dentists interested in working in the field of sleep. To find a dentist who is educated in  the field of sleep and the use of oral appliances, near you, visit the Web site of the American Academy of Dental Sleep Medicine.    References  1. Aleyda, et al. Objectively measured vs self-reported compliance during oral appliance therapy for sleep-disordered breathing. Chest 2013; 144(5): 2413-1979.  2. Lizz et al. Objective measurement of compliance during oral appliance therapy for sleep-disordered breathing. Thorax 2013; 68(1): 91-96.  3. Quinton et al. Mandibular advancement devices in 620 men and women with TAMARA and snoring: tolerability and predictors of treatment success. Chest 2004; 125: 6963-3532.  4. Michell, et al. Oral appliances for snoring and TAMARA: a review. Sleep 2006; 29: 244-262.  5. Obdulio et al. Oral appliance treatment for TAMARA: an update. J Clin Sleep Med 2014; 10(2): 215-227.  6. Derrick et al. Predictors of OSAH treatment outcome. J Dent Res 2007; 86: 3094-2590.      Weight Loss:   Your Body mass index is 25.69 kg/m .    Being overweight does not necessarily mean you will have health consequences.  Those who have BMI over 35 or over 27 with existing medical conditions carries greater risk.   Weight loss decreases severity of sleep apnea in most people with obesity. For those with mild obesity who have developed snoring with weight gain, even 15-30 pound weight loss can improve and occasionally milder eliminate sleep apnea.  Structured and life-long dietary and health habits are necessary to lose weight and keep healthier weight levels.     The Comprehensive Weight loss program offers all aspects of weight loss strategies including two Non-Surgical Weight Loss Programs: Medical Weight Management and our 24 Week Healthy Lifestyle Program:    Medical Weight Management: You will meet with a Medical Weight Management Provider, as well as a Registered Dietician. The program may include medication therapy, dietary education, recommended exercise and physical therapy programs,  monthly support group meetings, and possible psychological counseling. Follow up visits with the provider or dietician are scheduled based on your progress and needs.    24 Week Healthy Lifestyle Program: This unique program is designed to give you the support of weekly appointments and activities thru a 24-week period. It may include all of the components of the basic program (above), with the addition of 11 individual Health  Visits, 24-week access to the [employer] website for over 700 online classes, and monthly support group meetings. This program has an out-of-pocket expense of $499 to cover the items that can not be billed to insurance (health coaches and [employer] access), and is non-refundable/non-transferable (you may be able to use a Health Savings Account; ask your HSA provider). There may be an optional meal replacement plan prescribed as well.   Surgical management achieves meaningful long-term weight loss and improvement in health risks in most patients with more severe obesity.      Sleep Apnea Surgery:    Surgery for obstructive sleep apnea is considered generally only when other therapies fail to work. Surgery may be discussed with you if you are having a difficult time tolerating CPAP and or when there is an abnormal structure that requires surgical correction.  Nose and throat surgeries often enlarge the airway to prevent collapse.  Most of these surgeries create pain for 1-2 weeks and up to half of the most common surgeries are not effective throughout life.  You should carefully discuss the benefits and drawbacks to surgery with your sleep provider and surgeon to determine if it is the best solution for you.   More information  Surgery for TAMARA is directed at areas that are responsible for narrowing or complete obstruction of the airway during sleep.  There are a wide range of procedures available to enlarge and/or stabilize the airway to prevent blockage of breathing in the three major  areas where it can occur: the palate, tongue, and nasal regions.  Successful surgical treatment depends on the accurate identification of the factors responsible for obstructive sleep apnea in each person.  A personalized approach is required because there is no single treatment that works well for everyone.  Because of anatomic variation, consultation with an examination by a sleep surgeon is a critical first step in determining what surgical options are best for each patient.  In some cases, examination during sedation may be recommended in order to guide the selection of procedures.  Patients will be counseled about risks and benefits as well as the typical recovery course after surgery. Surgery is typically not a cure for a person s TAMARA.  However, surgery will often significantly improve one s TAMARA severity (termed  success rate ).  Even in the absence of a cure, surgery will decrease the cardiovascular risk associated with OSA7; improve overall quality of life8 (sleepiness, functionality, sleep quality, etc).      Palate Procedures:  Patients with TAMARA often have narrowing of their airway in the region of their tonsils and uvula.  The goals of palate procedures are to widen the airway in this region as well as to help the tissues resist collapse.  Modern palate procedure techniques focus on tissue conservation and soft tissue rearrangement, rather than tissue removal.  Often the uvula is preserved in this procedure. Residual sleep apnea is common in patient after pharyngoplasty with an average reduction in sleep apnea events of 33%2.      Tongue Procedures:  ExamWhile patients are awake, the muscles that surround the throat are active and keep this region open for breathing. These muscles relax during sleep, allowing the tongue and other structures to collapse and block breathing.  There are several different tongue procedures available.  Selection of a tongue base procedure depends on characteristics seen on  physical exam.  Generally, procedures are aimed at removing bulky tissues in this area or preventing the back of the tongue from falling back during sleep.  Success rates for tongue surgery range from 50-62%3.    Hypoglossal Nerve Stimulation:  Hypoglossal nerve stimulation has recently received approval from the [place] Food and Drug Administration for the treatment of obstructive sleep apnea.  This is based on research showing that the system was safe and effective in treating sleep apnea6.  Results showed that the median AHI score decreased 68%, from 29.3 to 9.0. This therapy uses an implant system that senses breathing patterns and delivers mild stimulation to airway muscles, which keeps the airway open during sleep.  The system consists of three fully implanted components: a small generator (similar in size to a pacemaker), a breathing sensor, and a stimulation lead.  Using a small handheld remote, a patient turns the therapy on before bed and off upon awakening.    Candidates for this device must be greater than 18 years of age, have moderate to severe obstructive sleep apnea with less than 25% central events  (AHI between 15-65), BMI less than 35, have tried CPAP/oral appliance for at least 8 weeks without success, and have appropriate upper airway anatomy (determined by a sleep endoscopy performed by Dr. Truman Edwards or Dr. Gabriele Hay).    Nasal Procedures:  Nasal obstruction can interfere with nasal breathing during the day and night.  Studies have shown that relief of nasal obstruction can improve the ability of some patients to tolerate positive airway pressure therapy for obstructive sleep apnea1.  Treatment options include medications such as nasal saline, topical corticosteroid and antihistamine sprays, and oral medications such as antihistamines or decongestants. Non-surgical treatments can include external nasal dilators for selected patients. If these are not successful by themselves,  surgery can improve the nasal airway either alone or in combination with these other options.        Combination Procedures:  Combination of surgical procedures and other treatments may be recommended, particularly if patients have more than one area of narrowing or persistent positional disease.  The success rate of combination surgery ranges from 66-80%2,3.    References  Pascale DOUGLASS. The Role of the Nose in Snoring and Obstructive Sleep Apnoea: An Update.  Eur Arch Otorhinolaryngol. 2011; 268: 1365-73.   Dotty SM; Constantine JA; Supa JR; Pallanch JF; Juice MB; Hilario SG; Brianna MIN. Surgical modifications of the upper airway for obstructive sleep apnea in adults: a systematic review and meta-analysis. SLEEP 2010;33(10):7718-7079. Wilber OH. Hypopharyngeal surgery in obstructive sleep apnea: an evidence-based medicine review.  Arch Otolaryngol Head Neck Surg. 2006 Feb;132(2):206-13.  Eric YH1, Nevin Y, Sergio KING. The efficacy of anatomically based multilevel surgery for obstructive sleep apnea. Otolaryngol Head Neck Surg. 2003 Oct;129(4):327-35.  Wilber OH, Goldberg A. Hypopharyngeal Surgery in Obstructive Sleep Apnea: An Evidence-Based Medicine Review. Arch Otolaryngol Head Neck Surg. 2006 Feb;132(2):206-13.  Sophie CORDERO et al. Upper-Airway Stimulation for Obstructive Sleep Apnea.  N Engl J Med. 2014 Jan 9;370(2):139-49.  Marcin Y et al. Increased Incidence of Cardiovascular Disease in Middle-aged Men with Obstructive Sleep Apnea. Am J Respir Crit Care Med; 2002 166: 159-165  Whiting EM et al. Studying Life Effects and Effectiveness of Palatopharyngoplasty (SLEEP) study: Subjective Outcomes of Isolated Uvulopalatopharyngoplasty. Otolaryngol Head Neck Surg. 2011; 144: 623-631.        WHAT IF I ONLY HAVE SNORING?    Mandibular advancement devices, lateral sleep positioning, long-term weight loss and treatment of nasal allergies have been shown to improve snoring.  Exercising tongue muscles with a game  (https://WooMe.[employer].GrabInbox/us/dolly/soundly-reduce-snoring/jx0198745036) or stimulating the tongue during the day with a device (https://doi.org/10.3390/rcp96562167) have improved snoring in some individuals.  https://www.[employer].GrabInbox/  https://www.sleepfoundation.org/best-anti-snoring-mouthpieces-and-mouthguards    Remember to Drive Safe... Drive Alive     Sleep health profoundly affects your health, mood, and your safety.  Thirty three percent of the population (one in three of us) is not getting enough sleep and many have a sleep disorder. Not getting enough sleep or having an untreated / undertreated sleep condition may make us sleepy without even knowing it. In fact, our driving could be dramatically impaired due to our sleep health. As your provider, here are some things I would like you to know about driving:     Here are some warning signs for impairment and dangerous drowsy driving:              -Having been awake more than 16 hours               -Looking tired               -Eyelid drooping              -Head nodding (it could be too late at this point)              -Driving for more than 30 minutes     Some things you could do to make the driving safer if you are experiencing some drowsiness:              -Stop driving and rest              -Call for transportation              -Make sure your sleep disorder is adequately treated     Some things that have been shown NOT to work when experiencing drowsiness while driving:              -Turning on the radio              -Opening windows              -Eating any  distracting  /  entertaining  foods (e.g., sunflower seeds, candy, or any other)              -Talking on the phone      Your decision may not only impact your life, but also the life of others. Please, remember to drive safe for yourself and all of us.           Your Body mass index is 25.69 kg/m .  Weight management is a personal decision.  If you are interested in exploring weight loss strategies,  the following discussion covers the approaches that may be successful. Body mass index (BMI) is one way to tell whether you are at a healthy weight, overweight, or obese. It measures your weight in relation to your height.  A BMI of 18.5 to 24.9 is in the healthy range. A person with a BMI of 25 to 29.9 is considered overweight, and someone with a BMI of 30 or greater is considered obese. More than two-thirds of American adults are considered overweight or obese.  Being overweight or obese increases the risk for further weight gain. Excess weight may lead to heart disease and diabetes.  Creating and following plans for healthy eating and physical activity may help you improve your health.  Weight control is part of healthy lifestyle and includes exercise, emotional health, and healthy eating habits. Careful eating habits lifelong are the mainstay of weight control. Though there are significant health benefits from weight loss, long-term weight loss with diet alone may be very difficult to achieve- studies show long-term success with dietary management in less than 10% of people. Attaining a healthy weight may be especially difficult to achieve in those with severe obesity. In some cases, medications, devices and surgical management might be considered.  What can you do?  If you are overweight or obese and are interested in methods for weight loss, you should discuss this with your provider.   Consider reducing daily calorie intake by 500 calories.   Keep a food journal.   Avoiding skipping meals, consider cutting portions instead.    Diet combined with exercise helps maintain muscle while optimizing fat loss. Strength training is particularly important for building and maintaining muscle mass. Exercise helps reduce stress, increase energy, and improves fitness. Increasing exercise without diet control, however, may not burn enough calories to loose weight.     Start walking three days a week 10-20 minutes at a  time  Work towards walking thirty minutes five days a week   Eventually, increase the speed of your walking for 1-2 minutes at time    In addition, we recommend that you review healthy lifestyles and methods for weight loss available through the [employer] of [employer] patient information sites:  http://win.niddk.nih.gov/publications/index.htm    And look into health and wellness programs that may be available through your health insurance provider, employer, local community center, or girma club.

## 2025-01-20 NOTE — NURSING NOTE
Current patient location: [address] [place] [id number]    Is the patient currently in the state of [place]? YES    Visit mode: VIDEO    If the visit is dropped, the patient can be reconnected by:VIDEO VISIT: Text to cell phone:   Telephone Information:   Mobile [phone]       Will anyone else be joining the visit? NO  (If patient encounters technical issues they should call [phone]    Are changes needed to the allergy or medication list? No    Are refills needed on medications prescribed by this physician? NO    Rooming Documentation:  Questionnaire(s) completed    Reason for visit: Consult    Tana KENDALL

## 2025-05-29 ENCOUNTER — TELEPHONE (OUTPATIENT)
Dept: SLEEP MEDICINE | Facility: CLINIC | Age: 45
End: 2025-05-29
Payer: COMMERCIAL

## 2025-05-29 NOTE — TELEPHONE ENCOUNTER
Pt was called to reschedule hst appt on 6/4. LVM with phone number to call asking them to call back.

## 2025-08-04 SDOH — HEALTH STABILITY: PHYSICAL HEALTH: ON AVERAGE, HOW MANY DAYS PER WEEK DO YOU ENGAGE IN MODERATE TO STRENUOUS EXERCISE (LIKE A BRISK WALK)?: 3 DAYS

## 2025-08-04 SDOH — HEALTH STABILITY: PHYSICAL HEALTH: ON AVERAGE, HOW MANY MINUTES DO YOU ENGAGE IN EXERCISE AT THIS LEVEL?: 40 MIN

## 2025-08-04 ASSESSMENT — SOCIAL DETERMINANTS OF HEALTH (SDOH): HOW OFTEN DO YOU GET TOGETHER WITH FRIENDS OR RELATIVES?: ONCE A WEEK

## 2025-08-05 ENCOUNTER — OFFICE VISIT (OUTPATIENT)
Dept: FAMILY MEDICINE | Facility: CLINIC | Age: 45
End: 2025-08-05
Attending: NURSE PRACTITIONER
Payer: COMMERCIAL

## 2025-08-05 VITALS
SYSTOLIC BLOOD PRESSURE: 118 MMHG | BODY MASS INDEX: 28.01 KG/M2 | OXYGEN SATURATION: 100 % | TEMPERATURE: 97.9 F | WEIGHT: 158.1 LBS | RESPIRATION RATE: 16 BRPM | HEART RATE: 80 BPM | DIASTOLIC BLOOD PRESSURE: 67 MMHG | HEIGHT: 63 IN

## 2025-08-05 DIAGNOSIS — Z13.1 SCREENING FOR DIABETES MELLITUS (DM): ICD-10-CM

## 2025-08-05 DIAGNOSIS — Z00.00 ROUTINE GENERAL MEDICAL EXAMINATION AT A HEALTH CARE FACILITY: Primary | ICD-10-CM

## 2025-08-05 DIAGNOSIS — Z13.6 CARDIOVASCULAR SCREENING; LDL GOAL LESS THAN 160: ICD-10-CM

## 2025-08-05 DIAGNOSIS — Z13.29 SCREENING FOR THYROID DISORDER: ICD-10-CM

## 2025-08-05 DIAGNOSIS — Z13.0 SCREENING FOR DISORDER OF BLOOD AND BLOOD-FORMING ORGANS: ICD-10-CM

## 2025-08-05 PROCEDURE — 3044F HG A1C LEVEL LT 7.0%: CPT | Performed by: NURSE PRACTITIONER

## 2025-08-05 PROCEDURE — 3074F SYST BP LT 130 MM HG: CPT | Performed by: NURSE PRACTITIONER

## 2025-08-05 PROCEDURE — 99396 PREV VISIT EST AGE 40-64: CPT | Performed by: NURSE PRACTITIONER

## 2025-08-05 PROCEDURE — 3048F LDL-C <100 MG/DL: CPT | Performed by: NURSE PRACTITIONER

## 2025-08-05 PROCEDURE — 3078F DIAST BP <80 MM HG: CPT | Performed by: NURSE PRACTITIONER

## 2025-08-07 ENCOUNTER — LAB (OUTPATIENT)
Dept: LAB | Facility: CLINIC | Age: 45
End: 2025-08-07
Payer: COMMERCIAL

## 2025-08-07 DIAGNOSIS — Z13.0 SCREENING FOR DISORDER OF BLOOD AND BLOOD-FORMING ORGANS: ICD-10-CM

## 2025-08-07 DIAGNOSIS — Z13.29 SCREENING FOR THYROID DISORDER: ICD-10-CM

## 2025-08-07 DIAGNOSIS — Z13.1 SCREENING FOR DIABETES MELLITUS (DM): ICD-10-CM

## 2025-08-07 DIAGNOSIS — Z13.6 CARDIOVASCULAR SCREENING; LDL GOAL LESS THAN 160: ICD-10-CM

## 2025-08-07 LAB
ALBUMIN SERPL BCG-MCNC: 4.2 G/DL (ref 3.5–5.2)
ALP SERPL-CCNC: 72 U/L (ref 40–150)
ALT SERPL W P-5'-P-CCNC: 10 U/L (ref 0–50)
ANION GAP SERPL CALCULATED.3IONS-SCNC: 9 MMOL/L (ref 7–15)
AST SERPL W P-5'-P-CCNC: 18 U/L (ref 0–45)
BILIRUB SERPL-MCNC: 0.6 MG/DL
BUN SERPL-MCNC: 14.1 MG/DL (ref 6–20)
CALCIUM SERPL-MCNC: 9.2 MG/DL (ref 8.8–10.4)
CHLORIDE SERPL-SCNC: 104 MMOL/L (ref 98–107)
CHOLEST SERPL-MCNC: 163 MG/DL
CREAT SERPL-MCNC: 0.93 MG/DL (ref 0.51–0.95)
EGFRCR SERPLBLD CKD-EPI 2021: 76 ML/MIN/1.73M2
ERYTHROCYTE [DISTWIDTH] IN BLOOD BY AUTOMATED COUNT: 12.9 % (ref 10–15)
EST. AVERAGE GLUCOSE BLD GHB EST-MCNC: 111 MG/DL
FASTING STATUS PATIENT QL REPORTED: YES
FASTING STATUS PATIENT QL REPORTED: YES
FERRITIN SERPL-MCNC: 13 NG/ML (ref 6–175)
GLUCOSE SERPL-MCNC: 91 MG/DL (ref 70–99)
HBA1C MFR BLD: 5.5 % (ref 0–5.6)
HCO3 SERPL-SCNC: 25 MMOL/L (ref 22–29)
HCT VFR BLD AUTO: 39.6 % (ref 35–47)
HDLC SERPL-MCNC: 60 MG/DL
HGB BLD-MCNC: 12.8 G/DL (ref 11.7–15.7)
IRON BINDING CAPACITY (ROCHE): 366 UG/DL (ref 240–430)
IRON SATN MFR SERPL: 36 % (ref 15–46)
IRON SERPL-MCNC: 133 UG/DL (ref 37–145)
LDLC SERPL CALC-MCNC: 88 MG/DL
MCH RBC QN AUTO: 27.4 PG (ref 26.5–33)
MCHC RBC AUTO-ENTMCNC: 32.3 G/DL (ref 31.5–36.5)
MCV RBC AUTO: 85 FL (ref 78–100)
NONHDLC SERPL-MCNC: 103 MG/DL
PLATELET # BLD AUTO: 365 10E3/UL (ref 150–450)
POTASSIUM SERPL-SCNC: 4.2 MMOL/L (ref 3.4–5.3)
PROT SERPL-MCNC: 7.1 G/DL (ref 6.4–8.3)
RBC # BLD AUTO: 4.68 10E6/UL (ref 3.8–5.2)
SODIUM SERPL-SCNC: 138 MMOL/L (ref 135–145)
TRIGL SERPL-MCNC: 75 MG/DL
TSH SERPL DL<=0.005 MIU/L-ACNC: 2.35 UIU/ML (ref 0.3–4.2)
WBC # BLD AUTO: 7.8 10E3/UL (ref 4–11)

## (undated) DEVICE — ESU CORD BIPOLAR GREEN 10-4000

## (undated) DEVICE — NDL 19GA 1.5"

## (undated) DEVICE — ESU HOLSTER PLASTIC DISP E2400

## (undated) DEVICE — GLOVE PROTEXIS W/NEU-THERA 7.0  2D73TE70

## (undated) DEVICE — DRSG GAUZE 4X4" TRAY 6939

## (undated) DEVICE — BLADE KNIFE BEAVER MICROSHARP BLUE 7514

## (undated) DEVICE — BLADE KNIFE SURG 15 371115

## (undated) DEVICE — SYR 10ML FINGER CONTROL W/O NDL 309695

## (undated) DEVICE — SOL WATER IRRIG 1000ML BOTTLE 07139-09

## (undated) DEVICE — SU CHROMIC 5-0 P-3 18" 687G

## (undated) DEVICE — SU PROLENE 5-0 P-3 18" 8698G

## (undated) DEVICE — GLOVE PROTEXIS W/NEU-THERA 7.5  2D73TE75

## (undated) DEVICE — NDL 30GA 1" 305128

## (undated) DEVICE — SU VICRYL 5-0 P-3 18" UND J493G

## (undated) DEVICE — DRAPE SPLIT EENT 76X124" 3X28" 9447

## (undated) DEVICE — PACK MINOR SBA15MIFSE

## (undated) RX ORDER — FENTANYL CITRATE 50 UG/ML
INJECTION, SOLUTION INTRAMUSCULAR; INTRAVENOUS
Status: DISPENSED
Start: 2019-02-01

## (undated) RX ORDER — SIMETHICONE 40MG/0.6ML
SUSPENSION, DROPS(FINAL DOSAGE FORM)(ML) ORAL
Status: DISPENSED
Start: 2019-02-01